# Patient Record
Sex: FEMALE | Race: WHITE | Employment: OTHER | URBAN - METROPOLITAN AREA
[De-identification: names, ages, dates, MRNs, and addresses within clinical notes are randomized per-mention and may not be internally consistent; named-entity substitution may affect disease eponyms.]

---

## 2017-06-28 ENCOUNTER — HOSPITAL ENCOUNTER (OUTPATIENT)
Dept: MAMMOGRAPHY | Age: 67
Discharge: HOME | End: 2017-06-28
Payer: MEDICARE

## 2017-06-28 DIAGNOSIS — Z12.31 ENCOUNTER FOR SCREENING MAMMOGRAM FOR MALIGNANT NEOPLASM OF BREAST: ICD-10-CM

## 2017-06-28 PROCEDURE — 77063 BREAST TOMOSYNTHESIS BI: CPT

## 2018-07-13 ENCOUNTER — HOSPITAL ENCOUNTER (OUTPATIENT)
Dept: MAMMOGRAPHY | Age: 68
Discharge: HOME | End: 2018-07-15
Payer: MEDICARE

## 2018-07-13 DIAGNOSIS — Z12.31 VISIT FOR SCREENING MAMMOGRAM: ICD-10-CM

## 2018-07-13 PROCEDURE — 77063 BREAST TOMOSYNTHESIS BI: CPT

## 2019-08-15 ENCOUNTER — HOSPITAL ENCOUNTER (OUTPATIENT)
Dept: MAMMOGRAPHY | Age: 69
Discharge: HOME | End: 2019-08-17
Payer: MEDICARE

## 2019-08-15 DIAGNOSIS — Z12.31 VISIT FOR SCREENING MAMMOGRAM: ICD-10-CM

## 2019-08-15 PROCEDURE — 77063 BREAST TOMOSYNTHESIS BI: CPT

## 2021-01-09 ENCOUNTER — HOSPITAL ENCOUNTER (OUTPATIENT)
Dept: MAMMOGRAPHY | Age: 71
Discharge: HOME | End: 2021-01-11
Payer: MEDICARE

## 2021-01-09 DIAGNOSIS — Z12.31 VISIT FOR SCREENING MAMMOGRAM: ICD-10-CM

## 2021-01-09 PROCEDURE — 77063 BREAST TOMOSYNTHESIS BI: CPT

## 2022-03-09 ENCOUNTER — HOSPITAL ENCOUNTER (OUTPATIENT)
Dept: MAMMOGRAPHY | Age: 72
Discharge: HOME | End: 2022-03-11
Payer: MEDICARE

## 2022-03-09 DIAGNOSIS — Z12.31 ENCOUNTER FOR SCREENING MAMMOGRAM FOR BREAST CANCER: ICD-10-CM

## 2022-03-09 PROCEDURE — 77063 BREAST TOMOSYNTHESIS BI: CPT

## 2022-09-19 ENCOUNTER — HOSPITAL ENCOUNTER (INPATIENT)
Age: 72
LOS: 1 days | Discharge: NURSING, REHAB OR LONG-TERM CARE | DRG: 193 | End: 2022-09-22
Attending: EMERGENCY MEDICINE | Admitting: HOSPITALIST
Payer: MEDICARE

## 2022-09-19 ENCOUNTER — APPOINTMENT (OUTPATIENT)
Dept: CT IMAGING | Age: 72
DRG: 193 | End: 2022-09-19
Payer: MEDICARE

## 2022-09-19 ENCOUNTER — APPOINTMENT (OUTPATIENT)
Dept: GENERAL RADIOLOGY | Age: 72
DRG: 193 | End: 2022-09-19
Payer: MEDICARE

## 2022-09-19 DIAGNOSIS — R41.82 ALTERED MENTAL STATUS, UNSPECIFIED ALTERED MENTAL STATUS TYPE: Primary | ICD-10-CM

## 2022-09-19 LAB
ABSOLUTE EOS #: 0.1 K/UL (ref 0–0.44)
ABSOLUTE IMMATURE GRANULOCYTE: 0.04 K/UL (ref 0–0.3)
ABSOLUTE LYMPH #: 1.59 K/UL (ref 1.1–3.7)
ABSOLUTE MONO #: 0.6 K/UL (ref 0.1–1.2)
AMMONIA: 51 UMOL/L (ref 11–51)
AMMONIA: 55 UMOL/L (ref 11–51)
ANION GAP SERPL CALCULATED.3IONS-SCNC: 11 MMOL/L (ref 9–17)
BASOPHILS # BLD: 0 % (ref 0–2)
BASOPHILS ABSOLUTE: <0.03 K/UL (ref 0–0.2)
BILIRUBIN URINE: ABNORMAL
BUN BLDV-MCNC: 12 MG/DL (ref 8–23)
BUN/CREAT BLD: 21 (ref 9–20)
CALCIUM SERPL-MCNC: 9.1 MG/DL (ref 8.6–10.4)
CHLORIDE BLD-SCNC: 100 MMOL/L (ref 98–107)
CO2: 25 MMOL/L (ref 20–31)
COLOR: YELLOW
CREAT SERPL-MCNC: 0.57 MG/DL (ref 0.5–0.9)
EOSINOPHILS RELATIVE PERCENT: 1 % (ref 1–4)
EPITHELIAL CELLS UA: ABNORMAL /HPF (ref 0–5)
GFR AFRICAN AMERICAN: >60 ML/MIN
GFR NON-AFRICAN AMERICAN: >60 ML/MIN
GFR SERPL CREATININE-BSD FRML MDRD: ABNORMAL ML/MIN/{1.73_M2}
GLUCOSE BLD-MCNC: 70 MG/DL (ref 70–99)
GLUCOSE URINE: NEGATIVE
HCT VFR BLD CALC: 33.9 % (ref 36.3–47.1)
HEMOGLOBIN: 11.1 G/DL (ref 11.9–15.1)
IMMATURE GRANULOCYTES: 0 %
KETONES, URINE: ABNORMAL
LEUKOCYTE ESTERASE, URINE: ABNORMAL
LYMPHOCYTES # BLD: 17 % (ref 24–43)
MCH RBC QN AUTO: 31.9 PG (ref 25.2–33.5)
MCHC RBC AUTO-ENTMCNC: 32.7 G/DL (ref 28.4–34.8)
MCV RBC AUTO: 97.4 FL (ref 82.6–102.9)
MONOCYTES # BLD: 7 % (ref 3–12)
MUCUS: ABNORMAL
NITRITE, URINE: NEGATIVE
NRBC AUTOMATED: 0 PER 100 WBC
PDW BLD-RTO: 16.2 % (ref 11.8–14.4)
PH UA: 6.5 (ref 5–8)
PLATELET # BLD: 196 K/UL (ref 138–453)
PMV BLD AUTO: 9.9 FL (ref 8.1–13.5)
POTASSIUM SERPL-SCNC: 3.8 MMOL/L (ref 3.7–5.3)
PRO-BNP: 129 PG/ML
PROCALCITONIN: 0.05 NG/ML
PROTEIN UA: ABNORMAL
RBC # BLD: 3.48 M/UL (ref 3.95–5.11)
RBC # BLD: ABNORMAL 10*6/UL
RBC UA: ABNORMAL /HPF (ref 0–2)
SARS-COV-2, RAPID: NOT DETECTED
SEG NEUTROPHILS: 75 % (ref 36–65)
SEGMENTED NEUTROPHILS ABSOLUTE COUNT: 6.79 K/UL (ref 1.5–8.1)
SODIUM BLD-SCNC: 136 MMOL/L (ref 135–144)
SPECIFIC GRAVITY UA: 1.02 (ref 1–1.03)
SPECIMEN DESCRIPTION: NORMAL
TROPONIN, HIGH SENSITIVITY: 16 NG/L (ref 0–14)
TURBIDITY: ABNORMAL
URINE HGB: ABNORMAL
UROBILINOGEN, URINE: NORMAL
WBC # BLD: 9.1 K/UL (ref 3.5–11.3)
WBC UA: ABNORMAL /HPF (ref 0–5)

## 2022-09-19 PROCEDURE — 80048 BASIC METABOLIC PNL TOTAL CA: CPT

## 2022-09-19 PROCEDURE — 2580000003 HC RX 258: Performed by: HOSPITALIST

## 2022-09-19 PROCEDURE — 87635 SARS-COV-2 COVID-19 AMP PRB: CPT

## 2022-09-19 PROCEDURE — G0378 HOSPITAL OBSERVATION PER HR: HCPCS

## 2022-09-19 PROCEDURE — 96361 HYDRATE IV INFUSION ADD-ON: CPT

## 2022-09-19 PROCEDURE — 96375 TX/PRO/DX INJ NEW DRUG ADDON: CPT

## 2022-09-19 PROCEDURE — 71045 X-RAY EXAM CHEST 1 VIEW: CPT

## 2022-09-19 PROCEDURE — 36415 COLL VENOUS BLD VENIPUNCTURE: CPT

## 2022-09-19 PROCEDURE — 2580000003 HC RX 258: Performed by: EMERGENCY MEDICINE

## 2022-09-19 PROCEDURE — 96376 TX/PRO/DX INJ SAME DRUG ADON: CPT

## 2022-09-19 PROCEDURE — 6360000002 HC RX W HCPCS: Performed by: EMERGENCY MEDICINE

## 2022-09-19 PROCEDURE — 87086 URINE CULTURE/COLONY COUNT: CPT

## 2022-09-19 PROCEDURE — 81001 URINALYSIS AUTO W/SCOPE: CPT

## 2022-09-19 PROCEDURE — 70450 CT HEAD/BRAIN W/O DYE: CPT

## 2022-09-19 PROCEDURE — 96372 THER/PROPH/DIAG INJ SC/IM: CPT

## 2022-09-19 PROCEDURE — 93005 ELECTROCARDIOGRAM TRACING: CPT | Performed by: EMERGENCY MEDICINE

## 2022-09-19 PROCEDURE — 83880 ASSAY OF NATRIURETIC PEPTIDE: CPT

## 2022-09-19 PROCEDURE — 99285 EMERGENCY DEPT VISIT HI MDM: CPT

## 2022-09-19 PROCEDURE — 6360000002 HC RX W HCPCS: Performed by: HOSPITALIST

## 2022-09-19 PROCEDURE — 84145 PROCALCITONIN (PCT): CPT

## 2022-09-19 PROCEDURE — 82140 ASSAY OF AMMONIA: CPT

## 2022-09-19 PROCEDURE — 84484 ASSAY OF TROPONIN QUANT: CPT

## 2022-09-19 PROCEDURE — 85025 COMPLETE CBC W/AUTO DIFF WBC: CPT

## 2022-09-19 PROCEDURE — 87040 BLOOD CULTURE FOR BACTERIA: CPT

## 2022-09-19 PROCEDURE — 96367 TX/PROPH/DG ADDL SEQ IV INF: CPT

## 2022-09-19 PROCEDURE — 96365 THER/PROPH/DIAG IV INF INIT: CPT

## 2022-09-19 RX ORDER — MAGNESIUM HYDROXIDE/ALUMINUM HYDROXICE/SIMETHICONE 120; 1200; 1200 MG/30ML; MG/30ML; MG/30ML
5 SUSPENSION ORAL EVERY 6 HOURS PRN
COMMUNITY

## 2022-09-19 RX ORDER — POTASSIUM CHLORIDE 20 MEQ/1
20 TABLET, EXTENDED RELEASE ORAL DAILY
COMMUNITY

## 2022-09-19 RX ORDER — VITAMIN B COMPLEX
100 TABLET ORAL DAILY
COMMUNITY

## 2022-09-19 RX ORDER — POTASSIUM CHLORIDE 7.45 MG/ML
10 INJECTION INTRAVENOUS PRN
Status: DISCONTINUED | OUTPATIENT
Start: 2022-09-19 | End: 2022-09-22 | Stop reason: HOSPADM

## 2022-09-19 RX ORDER — MEGESTROL ACETATE 40 MG/1
80 TABLET ORAL 2 TIMES DAILY
COMMUNITY

## 2022-09-19 RX ORDER — PHENOL 1.4 %
10 AEROSOL, SPRAY (ML) MUCOUS MEMBRANE NIGHTLY PRN
COMMUNITY

## 2022-09-19 RX ORDER — GLIMEPIRIDE 1 MG/1
1 TABLET ORAL 2 TIMES DAILY WITH MEALS
COMMUNITY

## 2022-09-19 RX ORDER — DULOXETIN HYDROCHLORIDE 30 MG/1
30 CAPSULE, DELAYED RELEASE ORAL DAILY
COMMUNITY

## 2022-09-19 RX ORDER — TAMOXIFEN CITRATE 20 MG/1
20 TABLET ORAL 2 TIMES DAILY
COMMUNITY

## 2022-09-19 RX ORDER — SENNA PLUS 8.6 MG/1
1 TABLET ORAL 2 TIMES DAILY PRN
Status: DISCONTINUED | OUTPATIENT
Start: 2022-09-19 | End: 2022-09-22 | Stop reason: HOSPADM

## 2022-09-19 RX ORDER — ACETAMINOPHEN 325 MG/1
650 TABLET ORAL EVERY 6 HOURS PRN
Status: DISCONTINUED | OUTPATIENT
Start: 2022-09-19 | End: 2022-09-22 | Stop reason: HOSPADM

## 2022-09-19 RX ORDER — PANTOPRAZOLE SODIUM 40 MG/1
40 TABLET, DELAYED RELEASE ORAL DAILY
COMMUNITY

## 2022-09-19 RX ORDER — METFORMIN HYDROCHLORIDE 500 MG/1
1000 TABLET, EXTENDED RELEASE ORAL 2 TIMES DAILY
COMMUNITY

## 2022-09-19 RX ORDER — SODIUM CHLORIDE 9 MG/ML
INJECTION, SOLUTION INTRAVENOUS PRN
Status: DISCONTINUED | OUTPATIENT
Start: 2022-09-19 | End: 2022-09-22 | Stop reason: HOSPADM

## 2022-09-19 RX ORDER — SODIUM CHLORIDE 0.9 % (FLUSH) 0.9 %
10 SYRINGE (ML) INJECTION EVERY 12 HOURS SCHEDULED
Status: DISCONTINUED | OUTPATIENT
Start: 2022-09-19 | End: 2022-09-22 | Stop reason: HOSPADM

## 2022-09-19 RX ORDER — DIVALPROEX SODIUM 500 MG/1
500 TABLET, DELAYED RELEASE ORAL 2 TIMES DAILY
COMMUNITY

## 2022-09-19 RX ORDER — LISINOPRIL 10 MG/1
10 TABLET ORAL DAILY
COMMUNITY

## 2022-09-19 RX ORDER — THIAMINE HCL 100 MG
500 TABLET ORAL 2 TIMES DAILY
COMMUNITY

## 2022-09-19 RX ORDER — DOCUSATE SODIUM 100 MG/1
100 CAPSULE, LIQUID FILLED ORAL 2 TIMES DAILY PRN
COMMUNITY

## 2022-09-19 RX ORDER — LEVETIRACETAM 1000 MG/1
1000 TABLET ORAL EVERY EVENING
Status: ON HOLD | COMMUNITY
End: 2022-09-22 | Stop reason: HOSPADM

## 2022-09-19 RX ORDER — CARVEDILOL 25 MG/1
25 TABLET ORAL 2 TIMES DAILY WITH MEALS
COMMUNITY

## 2022-09-19 RX ORDER — SIMVASTATIN 20 MG
20 TABLET ORAL NIGHTLY
COMMUNITY

## 2022-09-19 RX ORDER — POTASSIUM CHLORIDE 20 MEQ/1
40 TABLET, EXTENDED RELEASE ORAL PRN
Status: DISCONTINUED | OUTPATIENT
Start: 2022-09-19 | End: 2022-09-22 | Stop reason: HOSPADM

## 2022-09-19 RX ORDER — SODIUM CHLORIDE 9 MG/ML
INJECTION, SOLUTION INTRAVENOUS CONTINUOUS
Status: DISCONTINUED | OUTPATIENT
Start: 2022-09-19 | End: 2022-09-21

## 2022-09-19 RX ORDER — ONDANSETRON 4 MG/1
4 TABLET, ORALLY DISINTEGRATING ORAL EVERY 8 HOURS PRN
COMMUNITY

## 2022-09-19 RX ORDER — ONDANSETRON 2 MG/ML
4 INJECTION INTRAMUSCULAR; INTRAVENOUS EVERY 6 HOURS PRN
Status: DISCONTINUED | OUTPATIENT
Start: 2022-09-19 | End: 2022-09-22 | Stop reason: HOSPADM

## 2022-09-19 RX ORDER — LEVETIRACETAM 1000 MG/1
500 TABLET ORAL EVERY MORNING
Status: ON HOLD | COMMUNITY
End: 2022-09-22 | Stop reason: HOSPADM

## 2022-09-19 RX ORDER — M-VIT,TX,IRON,MINS/CALC/FOLIC 27MG-0.4MG
1 TABLET ORAL DAILY
COMMUNITY

## 2022-09-19 RX ORDER — TRAZODONE HYDROCHLORIDE 100 MG/1
100 TABLET ORAL NIGHTLY
COMMUNITY

## 2022-09-19 RX ORDER — SODIUM CHLORIDE 0.9 % (FLUSH) 0.9 %
10 SYRINGE (ML) INJECTION PRN
Status: DISCONTINUED | OUTPATIENT
Start: 2022-09-19 | End: 2022-09-22 | Stop reason: HOSPADM

## 2022-09-19 RX ORDER — ONDANSETRON 4 MG/1
4 TABLET, ORALLY DISINTEGRATING ORAL EVERY 8 HOURS PRN
Status: DISCONTINUED | OUTPATIENT
Start: 2022-09-19 | End: 2022-09-22 | Stop reason: HOSPADM

## 2022-09-19 RX ORDER — SODIUM CHLORIDE 9 MG/ML
INJECTION, SOLUTION INTRAVENOUS CONTINUOUS
Status: DISCONTINUED | OUTPATIENT
Start: 2022-09-19 | End: 2022-09-19 | Stop reason: SDUPTHER

## 2022-09-19 RX ORDER — ACETAMINOPHEN 500 MG
500 TABLET ORAL EVERY 6 HOURS PRN
Status: ON HOLD | COMMUNITY
End: 2022-09-22 | Stop reason: HOSPADM

## 2022-09-19 RX ORDER — MAGNESIUM SULFATE 1 G/100ML
1000 INJECTION INTRAVENOUS PRN
Status: DISCONTINUED | OUTPATIENT
Start: 2022-09-19 | End: 2022-09-22 | Stop reason: HOSPADM

## 2022-09-19 RX ORDER — ACETAMINOPHEN 650 MG/1
650 SUPPOSITORY RECTAL EVERY 6 HOURS PRN
Status: DISCONTINUED | OUTPATIENT
Start: 2022-09-19 | End: 2022-09-22 | Stop reason: HOSPADM

## 2022-09-19 RX ORDER — LANOLIN ALCOHOL/MO/W.PET/CERES
1000 CREAM (GRAM) TOPICAL DAILY
COMMUNITY

## 2022-09-19 RX ORDER — ENOXAPARIN SODIUM 100 MG/ML
40 INJECTION SUBCUTANEOUS DAILY
Status: DISCONTINUED | OUTPATIENT
Start: 2022-09-19 | End: 2022-09-22 | Stop reason: HOSPADM

## 2022-09-19 RX ADMIN — SODIUM CHLORIDE: 9 INJECTION, SOLUTION INTRAVENOUS at 12:39

## 2022-09-19 RX ADMIN — AZITHROMYCIN MONOHYDRATE 500 MG: 500 INJECTION, POWDER, LYOPHILIZED, FOR SOLUTION INTRAVENOUS at 15:38

## 2022-09-19 RX ADMIN — SODIUM CHLORIDE: 9 INJECTION, SOLUTION INTRAVENOUS at 21:33

## 2022-09-19 RX ADMIN — AZITHROMYCIN MONOHYDRATE 500 MG: 500 INJECTION, POWDER, LYOPHILIZED, FOR SOLUTION INTRAVENOUS at 23:44

## 2022-09-19 RX ADMIN — ENOXAPARIN SODIUM 40 MG: 100 INJECTION SUBCUTANEOUS at 21:36

## 2022-09-19 RX ADMIN — CEFTRIAXONE SODIUM 1000 MG: 1 INJECTION, POWDER, FOR SOLUTION INTRAMUSCULAR; INTRAVENOUS at 14:37

## 2022-09-19 ASSESSMENT — PAIN SCALES - WONG BAKER
WONGBAKER_NUMERICALRESPONSE: 0

## 2022-09-19 ASSESSMENT — PAIN - FUNCTIONAL ASSESSMENT: PAIN_FUNCTIONAL_ASSESSMENT: NONE - DENIES PAIN

## 2022-09-19 NOTE — ED PROVIDER NOTES
[address] [place] [address] ED  EMERGENCY DEPARTMENT ENCOUNTER      Pt Name: Sameer Shaikh  MRN: [id number]  [place] 1950  Date of evaluation: 9/19/2022  Provider: Kaiden Montenegro MD    [address] [place] [address]       Chief Complaint   Patient presents with    Altered Mental Status         HISTORY OF PRESENT ILLNESS  (Location/Symptom, Timing/Onset, Context/Setting, Quality, Duration, Modifying Factors, Severity.)   Sameer Shaikh is a 71 y.o. female who presents to the emergency department for altered mental status. All the history is given by her . He states that she did not sleep well last night and today she was not as active or interactive as she normally would be. No fever but she was sweaty. No cough or vomiting. He is worried that she might have a UTI which has happened previously. She is unable to give any history at all, she has dementia. He states normally she will get up and walk around but he was not able to get her walking today. Nursing Notes were reviewed. ALLERGIES     Patient has no known allergies. CURRENT MEDICATIONS       Previous Medications    No medications on file       PAST MEDICAL HISTORY   No past medical history on file. SURGICAL HISTORY     No past surgical history on file. FAMILY HISTORY     No family history on file. No family status information on file. SOCIAL HISTORY          REVIEW OF SYSTEMS    (2-9 systems for level 4, 10 or more for level 5)     Review of Systems   Unable to perform ROS: Dementia      Except as noted above the remainder of the review of systems was reviewed and negative. PHYSICAL EXAM    (up to 7 for level 4, 8 or more for level 5)     Vitals:    09/19/22 1150   BP: 116/66   Pulse: 83   Resp: 13   Temp: 98.2 °F (36.8 °C)   TempSrc: Axillary   SpO2: 95%       Physical Exam  Vitals reviewed. Constitutional:       General: She is not in acute distress. Appearance: She is well-developed. She is not diaphoretic.    HENT: Head: Normocephalic and atraumatic. Eyes:      General: No scleral icterus. Right eye: No discharge. Left eye: No discharge. Cardiovascular:      Rate and Rhythm: Normal rate and regular rhythm. Pulmonary:      Effort: Pulmonary effort is normal. No respiratory distress. Breath sounds: Normal breath sounds. No stridor. No wheezing or rales. Abdominal:      General: There is no distension. Palpations: Abdomen is soft. Tenderness: no abdominal tenderness   Musculoskeletal:         General: Normal range of motion. Cervical back: Neck supple. Lymphadenopathy:      Cervical: No cervical adenopathy. Skin:     General: Skin is warm and dry. Findings: No erythema or rash. Neurological:      Mental Status: She is alert. Comments: To open her eyes and look and follow simple commands. She does not answer any questions. She moves all 4 extremities. Psychiatric:      Comments: Cannot be tested           DIAGNOSTIC RESULTS     EKG: All EKG's are interpreted by the Emergency Department Physician who either signs or Co-signs this chart in the absence of a cardiologist.    GMI interpretation shows no acute findings and normal sinus rhythm    RADIOLOGY:   Non-plain film images such as CT, Ultrasound and MRI are read by the radiologist. Plain radiographic images are visualized and preliminarily interpreted by the emergency physician with the below findings:    Interpretation per the Radiologist below, if available at the time of this note:    CT HEAD WO CONTRAST    Result Date: 9/19/2022  EXAMINATION: CT OF THE HEAD WITHOUT CONTRAST  9/19/2022 9:56 am TECHNIQUE: CT of the head was performed without the administration of intravenous contrast. Automated exposure control, iterative reconstruction, and/or weight based adjustment of the mA/kV was utilized to reduce the radiation dose to as low as reasonably achievable. COMPARISON: None.  HISTORY: ORDERING SYSTEM PROVIDED HISTORY: Altered mental status TECHNOLOGIST PROVIDED HISTORY: Altered mental status Decision Support Exception - unselect if not a suspected or confirmed emergency medical condition->Emergency Medical Condition (MA) Reason for Exam: Altered mental status CT BRAIN FINDINGS: BRAIN/VENTRICLES: Old lacunar infarct in the right basal ganglia. The brainstem, and cerebellum have a normal appearance for the patient's age. The falx is midline. The ventricles and peripheral sulci are mildly dilated. There is decreased attenuation in the periventricular white matter. There is no sign of a space occupying lesion, acute infarction, or hemorrhage. Orbits: Portion of the orbits demonstrate no acute abnormality. SINUSES: . The imaged portions of the paranasal sinuses are clear. The mastoids and the middle ear chambers are clear. SOFT TISSUES/SKULL:  No acute abnormality of the visualized skull or soft tissues. Vascular calcifications are seen compatible with atherosclerotic disease. Mild central and cortical cerebral atrophy. Mild chronic deep white matter ischemic changes Old lacunar infarct in the right basal ganglia. No acute intracranial abnormalities are noted. XR CHEST PORTABLE    Result Date: 9/19/2022  EXAMINATION: ONE XRAY VIEW OF THE CHEST 9/19/2022 12:29 pm COMPARISON: None. HISTORY: ORDERING SYSTEM PROVIDED HISTORY: Altered mental status TECHNOLOGIST PROVIDED HISTORY: Altered mental status Reason for Exam: AMS. Confusion. FINDINGS: Low lung volumes and portable technique limit evaluation. Heart size is increase in this also obscures the left lung base. Prominent central pulmonary vascular interstitial prominence may be vascular crowding versus mild congestive change. Bibasilar atelectasis or infiltrates greater on the left suspected. Left-sided pleural effusion not excluded. Mediastinum unremarkable. No acute osseous abnormality.      Limited exam. Cardiomegaly with vascular crowding and or mild pulmonary vascular congestion. Bibasilar atelectasis or infiltrates crowding left with possible small left effusion. LABS:  Labs Reviewed   CBC WITH AUTO DIFFERENTIAL - Abnormal; Notable for the following components:       Result Value    RBC 3.48 (*)     Hemoglobin 11.1 (*)     Hematocrit 33.9 (*)     RDW 16.2 (*)     Seg Neutrophils 75 (*)     Lymphocytes 17 (*)     All other components within normal limits   BASIC METABOLIC PANEL - Abnormal; Notable for the following components:    Bun/Cre Ratio 21 (*)     All other components within normal limits   URINALYSIS - Abnormal; Notable for the following components:    Turbidity UA Cloudy (*)     Bilirubin Urine   (*)     Value: Presumptive positive. Unable to confirm due to unavailability of reagent. Ketones, Urine 3+ (*)     Urine Hgb 3+ (*)     Protein, UA TRACE (*)     Leukocyte Esterase, Urine TRACE (*)     All other components within normal limits   MICROSCOPIC URINALYSIS - Abnormal; Notable for the following components:    Mucus, UA 2+ (*)     All other components within normal limits   COVID-19, RAPID   CULTURE, BLOOD 1   CULTURE, BLOOD 1       All other labs were within normal range or not returned as of this dictation. EMERGENCY DEPARTMENT COURSE and DIFFERENTIAL DIAGNOSIS/MDM:   Vitals:    Vitals:    09/19/22 1150   BP: 116/66   Pulse: 83   Resp: 13   Temp: 98.2 °F (36.8 °C)   TempSrc: Axillary   SpO2: 95%       Orders Placed This Encounter   Medications    0.9 % sodium chloride infusion    cefTRIAXone (ROCEPHIN) 1,000 mg in dextrose 5 % 50 mL IVPB mini-bag     Order Specific Question:   Antimicrobial Indications     Answer:   Pneumonia (CAP)    azithromycin (ZITHROMAX) 500 mg in D5W 250ml addavial     Order Specific Question:   Antimicrobial Indications     Answer:   Pneumonia (CAP)       Medical Decision Making: The patient presents with altered mental status. There is questionable UTI and questionable pneumonia.   Cultures were obtained and she was given IV antibiotics and is being admitted. Findings are discussed with her . COVID test was negative. CONSULTS:  IP CONSULT TO HOSPITALIST    PROCEDURES:  None    FINAL IMPRESSION      1. Altered mental status, unspecified altered mental status type          DISPOSITION/PLAN   DISPOSITION Decision To Admit 09/19/2022 02:05:05 PM      PATIENT REFERRED TO:   No follow-up provider specified. DISCHARGE MEDICATIONS:     New Prescriptions    No medications on file       The care is provided during an unprecedented national emergency due to the novel coronavirus, COVID-19.     (Please note that portions of this note were completed with a voice recognition program.  Efforts were made to edit the dictations but occasionally words are mis-transcribed.)    Kaiden Montenegro MD  Attending Emergency Physician           Kaiden Montenegro MD  09/19/22 2834

## 2022-09-19 NOTE — PROGRESS NOTES
Transitions of Care Pharmacy Service   Medication Review    The patient's list of current home medications has been reviewed and updated. Source(s) of information: Patient's spouse Cuco Haynes ([address])    Patient started Depakote ER 500mg BID on 9/7/22 and has been transitioning off Keppra. She was originally on Keppra 1000mg BID. Her current dose is 500mg in the morning and 1000mg in the evening for today (9/19) and tomorrow (9/20). Then she will begin 500mg BID from 9/21-9/27 and finally take 500mg daily from 9/28-10/4. On 10/5 the Keppra will be stopped. Also, she is on tamoxifen alternating with megestrol every 3 weeks. She takes tamoxifen 20mg BID x 3 weeks then megestrol 80mg BID x 3 weeks. She just started back on the tamoxifen yesterday 9/18. Please feel free to call with any questions about this encounter. Thank you. Cuco Ivy, [facility]  Transitions of Care Pharmacy Service  Phone:  [phone]  Fax: [phone]    Prior to Admission medications    Medication Sig Start Date End Date Taking?  Authorizing Provider   carvedilol (COREG) 25 MG tablet Take 25 mg by mouth 2 times daily (with meals)   Yes Historical Provider, MD   DULoxetine (CYMBALTA) 30 MG extended release capsule Take 30 mg by mouth daily   Yes Historical Provider, MD   glimepiride (AMARYL) 1 MG tablet Take 1 mg by mouth 2 times daily (with meals)   Yes Historical Provider, MD   lisinopril (PRINIVIL;ZESTRIL) 10 MG tablet Take 10 mg by mouth daily   Yes Historical Provider, MD   pantoprazole (PROTONIX) 40 MG tablet Take 40 mg by mouth daily   Yes Historical Provider, MD   potassium chloride (KLOR-CON M) 20 MEQ extended release tablet Take 20 mEq by mouth daily   Yes Historical Provider, MD   simvastatin (ZOCOR) 20 MG tablet Take 20 mg by mouth nightly   Yes Historical Provider, MD   tamoxifen (NOLVADEX) 20 MG tablet Take 20 mg by mouth 2 times daily Indications: alternates with megestrol 80mg BID every 3 weeks, just started tamoxifen on 9/18/22   Yes Historical Provider, MD   levETIRAcetam (KEPPRA) 1000 MG tablet Take 500 mg by mouth every morning Indications: transitioning off keppra - 500mg BID from 9/21-9/27 then 500mg daily from 9/28-10/4 then off   Yes Historical Provider, MD   levETIRAcetam (KEPPRA) 1000 MG tablet Take 1,000 mg by mouth every evening Indications: transitioning off keppra - 500mg BID from 9/21-9/27 then 500mg daily from 9/28-10/4 then off   Yes Historical Provider, MD   metFORMIN (GLUCOPHAGE-XR) 500 MG extended release tablet Take 1,000 mg by mouth in the morning and at bedtime   Yes Historical Provider, MD   traZODone (DESYREL) 100 MG tablet Take 100 mg by mouth nightly   Yes Historical Provider, MD   Probiotic Product (PROBIOTIC ADVANCED PO) Take 1 tablet by mouth daily   Yes Historical Provider, MD   GINKGO BILOBA EXTRACT PO Take 1 tablet by mouth daily   Yes Historical Provider, MD   Magnesium 500 MG TABS Take 500 mg by mouth 2 times daily   Yes Historical Provider, MD   Multiple Vitamins-Minerals (THERAPEUTIC MULTIVITAMIN-MINERALS) tablet Take 1 tablet by mouth daily   Yes Historical Provider, MD   acetaminophen (TYLENOL) 500 MG tablet Take 500 mg by mouth every 6 hours as needed for Pain   Yes Historical Provider, MD   Coenzyme Q10 (COQ10) 100 MG CAPS Take 100 mg by mouth daily   Yes Historical Provider, MD   APPLE CIDER VINEGAR PO Take 1 tablet by mouth daily   Yes Historical Provider, MD   docusate sodium (COLACE) 100 MG capsule Take 100 mg by mouth 2 times daily as needed for Constipation   Yes Historical Provider, MD   aluminum & magnesium hydroxide-simethicone (MAALOX) 200-200-20 MG/5ML SUSP suspension Take 5 mLs by mouth every 6 hours as needed for Indigestion   Yes Historical Provider, MD   vitamin B-12 (CYANOCOBALAMIN) 1000 MCG tablet Take 1,000 mcg by mouth daily   Yes Historical Provider, MD   Melatonin 10 MG TABS Take 10 mg by mouth nightly as needed (sleep)   Yes Historical Provider, MD   ondansetron (ZOFRAN-ODT) 4 MG disintegrating tablet Take 4 mg by mouth every 8 hours as needed for Nausea or Vomiting   Yes Historical Provider, MD   megestrol (MEGACE) 40 MG tablet Take 80 mg by mouth 2 times daily Indications: alternates with tamoxifen 20mg BID every 3 weeks, next megestrol dose due 10/9/22   Yes Historical Provider, MD   divalproex (DEPAKOTE) 500 MG DR tablet Take 500 mg by mouth 2 times daily Indications: started on 9/7/22, patient currently transitioning off [address] - last dose will be 10/5/22   Yes Historical Provider, MD

## 2022-09-19 NOTE — ED NOTES
ED to inpatient nurses report     Chief Complaint   Patient presents with    Altered Mental Status      Present to ED from home via EMS. Pt  stated pt is more confused than normal. Pt has hx of dementia and parkinson. Pt  is her primary care taker. LOC: disoriented  Vital signs   Vitals:    09/19/22 1150   BP: 116/66   Pulse: 83   Resp: 13   Temp: 98.2 °F (36.8 °C)   TempSrc: Axillary   SpO2: 95%      Oxygen Baseline room air    Current needs required room air   LDAs:   Peripheral IV 09/19/22 Right Antecubital (Active)   Site Assessment Clean, dry & intact 09/19/22 1235   Line Status Blood return noted; Flushed;Normal saline locked;Specimen collected 09/19/22 1235   Dressing Status Clean, dry & intact 09/19/22 1235     Mobility: Fully dependent  Fall Risk: [place] 1 Fall Risk  Presents to emergency department  because of falls (Syncope, seizure, or loss of consciousness): No (09/19/22 1149)  Age > 79: Yes (09/19/22 1149)  Altered Mental Status, Intoxication with alcohol or substance confusion (Disorientation, impaired judgment, poor safety awaremess, or inability to follow instructions): Yes (09/19/22 1149)  Impaired Mobility: Ambulates or transfers with assistive devices or assistance;  Unable to ambulate or transer.: No (09/19/22 1149)  Nursing Judgement: Yes (09/19/22 1149)     Pending ED orders: none  Present condition: stable  Code Status: full code    Consults: IP CONSULT TO HOSPITALIST  [x]  Hospitalist   Completed  [x] yes [] no Who: Ulises Lazaro  []  Medicine  Completed  [] yes [] No Who:   []  Cardiology  Completed  [] yes [] No Who:   []  GI   Completed  [] yes [] No Who:   []  Neurology  Completed  [] yes [] No Who:   []  Nephrology Completed  [] yes [] No Who:    []  Vascular  Completed  [] yes [] No Who:   []  Ortho  Completed  [] yes [] No Who:     []  Surgery  Completed  [] yes [] No Who:    []  Urology  Completed  [] yes [] No Who:    []  CT Surgery Completed  [] yes [] No Who:   [] Podiatry  Completed  [] yes [] No Who:    []  Other    Completed  [] yes [] No Who:    Interventions: IV, labs, cultures, urine specimen, EKG, xray, CT, antibiotics, straight cath  Important Events: urine shows UTI. Zithro q24, rocephion q24.        Electronically signed by Ela Drew RN on 9/19/2022 at 7:46 PM            Ela Drew, [address] [place] [address]  09/19/22 3368

## 2022-09-19 NOTE — ED NOTES
Pt's  arrived to ED.  states this morning he was getting pt up for the day and pt \"abruptly sat down and began to shake\".  states pt has seizure disorder and is worried she may have had a seizure. Pt has \"bad days\" where she is completely un oriented-  states today is a day like that. Pt has alzheimer's and dementia. Pt is typically ambulatory but \"today seemed weak\".  is concerned about possible UTI as well as possible seizure.      Corbin Tyson RN  09/19/22 1465

## 2022-09-19 NOTE — ED NOTES
Pt to ED via EMS- EMS states pt's  called- pt has been altered for about three days. Pt has hx of dementia and parkinson's. Pt unoriented X4- unable to answer questions for herself. Pt does not seem to understand any questions RN asks.  EMS reports that  is great historian- RN awaiting 's arrival.      Pablo Koehler RN  09/19/22 0357

## 2022-09-19 NOTE — H&P
History & Physical  [facility].,    Adult Hospitalist      Name: Sue Aguirre  MRN: [id number]     Acct: [de-identified]  Room: [facility]/18    Admit Date: 9/19/2022 11:47 AM  PCP: Cesar Ray MD    Primary Problem  Active Problems:    * No active hospital problems. *  Resolved Problems:    * No resolved hospital problems. *        Assesment:     Concern for seizure  Acute metabolic encephalopathy  Bacteriuria  Left pleural effusion  Pulmonary vascular congestion/cardiomegaly  Bibasilar infiltrate/pneumonia-likely bacterial  Dementia          Plan:     Admitted to Medr telemetry  O2 to maintain oxygen saturation greater than 92%    Serial troponin  BNP  Echocardiogram    Procalcitonin  IV ceftriaxone and Zithromax  Monitor respiratory status  Urine culture    MRI brain  Monitor mental status  Ammonia  Neurology consult    Continue to monitor/telemetry/CBC with differential daily/BMP daily  DVT and GI prophylaxis. Continue medications as below      Scheduled Meds:   azithromycin  500 mg IntraVENous Once     Continuous Infusions:   sodium chloride 100 mL/hr at 09/19/22 1239     PRN Meds:       Chief Complaint:     Chief Complaint   Patient presents with    Altered Mental Status         History of Present Illness:      Sue Aguirre is a 71 y.o.  female who presents with Altered Mental Status    36-year-old lady was brought to the ER by her . There was concern the patient was not acting right. Per report, patient abruptly sat down and began to shake. Initially has been was concerned if she has had seizure. Patient has been intermittently confused and disoriented. Also complaining of very weak.  is concerned that usually when she gets infection she  Back. Patient has Alzheimer's dementia. She denies any chest pain, cough, cold, changes in urination, bowel habit or rash. On presentation hemoglobin was 11.1. UA was significant for trace leukocyte Estrace.   X-ray chest shows cardiomegaly with vascular crowding and mild pulmonary vascular congestion. Bibasilar atelectasis or infiltrate crowding left with possible small left effusion. CT brain did not show any acute changes. I have personally reviewed the past medical history, past surgical history, medications, social history, and family history, and summarized in the note. Review of Systems:     All 10 point system is reviewed and negative otherwise mentioned in HPI. Past Medical History:     No past medical history on file. Past Surgical History:     No past surgical history on file. Medications Prior to Admission:       Prior to Admission medications    Not on File        Allergies:       Patient has no known allergies. Social History:     Tobacco:    has no history on file for tobacco use. Alcohol:      has no history on file for alcohol use. Drug Use:  has no history on file for drug use. Family History:     No family history on file. Physical Exam:     Vitals:  /66   Pulse 83   Temp 98.2 °F (36.8 °C) (Axillary)   Resp 13   Ht 5' 2\" (1.575 m)   Wt 203 lb (92.1 kg)   SpO2 95%   BMI 37.13 kg/m²   Temp (24hrs), Av.2 °F (36.8 °C), Min:98.2 °F (36.8 °C), Max:98.2 °F (36.8 °C)          General appearance - alert, well appearing, and in no acute distress  Mental status -confused  Head - normocephalic and atraumatic  Eyes - pupils equal and reactive, extraocular eye movements intact, conjunctiva clear  Ears - hearing appears to be intact  Nose - no drainage noted  Mouth - mucous membranes moist  Neck - supple, no carotid bruits, thyroid not palpable  Chest - clear to auscultation, normal effort  Heart - normal rate, regular rhythm, no murmur  Abdomen - soft, nontender, nondistended, bowel sounds present all four quadrants, no masses, hepatomegaly or splenomegaly  Neurological - normal speech, no focal findings or movement disorder noted,.   Extremities - peripheral pulses palpable, no pedal edema or calf pain with palpation  Skin - no gross lesions, rashes, or induration noted        Data:     Labs:    Hematology:  Recent Labs     09/19/22  1231   WBC 9.1   RBC 3.48*   HGB 11.1*   HCT 33.9*   MCV 97.4   MCH 31.9   MCHC 32.7   RDW 16.2*      MPV 9.9     Chemistry:  Recent Labs     09/19/22  1231      K 3.8      CO2 25   GLUCOSE 70   BUN 12   CREATININE 0.57   ANIONGAP 11   LABGLOM >60   GFRAA >60   CALCIUM 9.1     No results for input(s): PROT, LABALBU, LABA1C, O6GPTTX, H2WKFTY, FT4, TSH, AST, ALT, LDH, GGT, ALKPHOS, LABGGT, BILITOT, BILIDIR, AMMONIA, AMYLASE, LIPASE, LACTATE, CHOL, HDL, LDLCHOLESTEROL, CHOLHDLRATIO, TRIG, VLDL, IBP32VN, PHENYTOIN, PHENYF, URICACID, POCGLU in the last 72 hours. No results found for: INR, PROTIME    No results found for: SPECIAL  No results found for: CULTURE    No results found for: POCPH, PHART, PH, POCPCO2, PXC0VLP, PCO2, POCPO2, PO2ART, PO2, POCHCO3, WUR6HTT, HCO3, NBEA, PBEA, BEART, BE, THGBART, THB, IBS0BTJ, ZYCF6OYJ, I8IOTJUC, O2SAT, FIO2    Radiology:    CT HEAD WO CONTRAST    Result Date: 9/19/2022  Mild central and cortical cerebral atrophy. Mild chronic deep white matter ischemic changes Old lacunar infarct in the right basal ganglia. No acute intracranial abnormalities are noted. XR CHEST PORTABLE    Result Date: 9/19/2022  Limited exam. Cardiomegaly with vascular crowding and or mild pulmonary vascular congestion. Bibasilar atelectasis or infiltrates crowding left with possible small left effusion.          All radiological studies reviewed                Code Status:  No Order    Electronically signed by Verona Smith MD on 9/19/2022 at 3:25 PM     Copy sent to Dr. Beverly Bailey MD    This note was created with the assistance of a speech-recognition program.  Although the intention is to generate a document that actually reflects the content of the visit, no guarantees can be provided that every mistake has been identified and corrected by editing. Note was updated later by me after  physical examination and  completion of the assessment.

## 2022-09-20 LAB
ABSOLUTE EOS #: 0.12 K/UL (ref 0–0.44)
ABSOLUTE IMMATURE GRANULOCYTE: 0.04 K/UL (ref 0–0.3)
ABSOLUTE LYMPH #: 1.56 K/UL (ref 1.1–3.7)
ABSOLUTE MONO #: 0.48 K/UL (ref 0.1–1.2)
ALBUMIN SERPL-MCNC: 3.2 G/DL (ref 3.5–5.2)
ALP BLD-CCNC: 41 U/L (ref 35–104)
ALT SERPL-CCNC: 12 U/L (ref 5–33)
ANION GAP SERPL CALCULATED.3IONS-SCNC: 5 MMOL/L (ref 9–17)
AST SERPL-CCNC: 13 U/L
BASOPHILS # BLD: 0 % (ref 0–2)
BASOPHILS ABSOLUTE: <0.03 K/UL (ref 0–0.2)
BILIRUB SERPL-MCNC: 0.2 MG/DL (ref 0.3–1.2)
BILIRUBIN DIRECT: <0.1 MG/DL
BILIRUBIN, INDIRECT: ABNORMAL MG/DL (ref 0–1)
BUN BLDV-MCNC: 7 MG/DL (ref 8–23)
BUN/CREAT BLD: 14 (ref 9–20)
CALCIUM SERPL-MCNC: 8.1 MG/DL (ref 8.6–10.4)
CHLORIDE BLD-SCNC: 108 MMOL/L (ref 98–107)
CO2: 27 MMOL/L (ref 20–31)
CREAT SERPL-MCNC: 0.49 MG/DL (ref 0.5–0.9)
EOSINOPHILS RELATIVE PERCENT: 2 % (ref 1–4)
GFR AFRICAN AMERICAN: >60 ML/MIN
GFR NON-AFRICAN AMERICAN: >60 ML/MIN
GFR SERPL CREATININE-BSD FRML MDRD: ABNORMAL ML/MIN/{1.73_M2}
GLUCOSE BLD-MCNC: 142 MG/DL (ref 65–105)
GLUCOSE BLD-MCNC: 96 MG/DL (ref 70–99)
HCT VFR BLD CALC: 31.6 % (ref 36.3–47.1)
HEMOGLOBIN: 10.1 G/DL (ref 11.9–15.1)
IMMATURE GRANULOCYTES: 1 %
LV EF: 65 %
LVEF MODALITY: NORMAL
LYMPHOCYTES # BLD: 27 % (ref 24–43)
MCH RBC QN AUTO: 31.4 PG (ref 25.2–33.5)
MCHC RBC AUTO-ENTMCNC: 32 G/DL (ref 28.4–34.8)
MCV RBC AUTO: 98.1 FL (ref 82.6–102.9)
MONOCYTES # BLD: 8 % (ref 3–12)
NRBC AUTOMATED: 0 PER 100 WBC
PDW BLD-RTO: 16.3 % (ref 11.8–14.4)
PLATELET # BLD: 174 K/UL (ref 138–453)
PMV BLD AUTO: 9.8 FL (ref 8.1–13.5)
POTASSIUM SERPL-SCNC: 3.8 MMOL/L (ref 3.7–5.3)
PRO-BNP: 512 PG/ML
RBC # BLD: 3.22 M/UL (ref 3.95–5.11)
RBC # BLD: ABNORMAL 10*6/UL
SEG NEUTROPHILS: 62 % (ref 36–65)
SEGMENTED NEUTROPHILS ABSOLUTE COUNT: 3.66 K/UL (ref 1.5–8.1)
SODIUM BLD-SCNC: 140 MMOL/L (ref 135–144)
TOTAL PROTEIN: 5 G/DL (ref 6.4–8.3)
TROPONIN, HIGH SENSITIVITY: 19 NG/L (ref 0–14)
TROPONIN, HIGH SENSITIVITY: 25 NG/L (ref 0–14)
WBC # BLD: 5.9 K/UL (ref 3.5–11.3)

## 2022-09-20 PROCEDURE — 95816 EEG AWAKE AND DROWSY: CPT

## 2022-09-20 PROCEDURE — 6370000000 HC RX 637 (ALT 250 FOR IP): Performed by: HOSPITALIST

## 2022-09-20 PROCEDURE — 84484 ASSAY OF TROPONIN QUANT: CPT

## 2022-09-20 PROCEDURE — 96366 THER/PROPH/DIAG IV INF ADDON: CPT

## 2022-09-20 PROCEDURE — 6360000002 HC RX W HCPCS: Performed by: HOSPITALIST

## 2022-09-20 PROCEDURE — 80048 BASIC METABOLIC PNL TOTAL CA: CPT

## 2022-09-20 PROCEDURE — 83880 ASSAY OF NATRIURETIC PEPTIDE: CPT

## 2022-09-20 PROCEDURE — 85025 COMPLETE CBC W/AUTO DIFF WBC: CPT

## 2022-09-20 PROCEDURE — 80076 HEPATIC FUNCTION PANEL: CPT

## 2022-09-20 PROCEDURE — 96361 HYDRATE IV INFUSION ADD-ON: CPT

## 2022-09-20 PROCEDURE — 93306 TTE W/DOPPLER COMPLETE: CPT

## 2022-09-20 PROCEDURE — 96372 THER/PROPH/DIAG INJ SC/IM: CPT

## 2022-09-20 PROCEDURE — 82947 ASSAY GLUCOSE BLOOD QUANT: CPT

## 2022-09-20 PROCEDURE — G0378 HOSPITAL OBSERVATION PER HR: HCPCS

## 2022-09-20 PROCEDURE — 2580000003 HC RX 258: Performed by: HOSPITALIST

## 2022-09-20 PROCEDURE — 36415 COLL VENOUS BLD VENIPUNCTURE: CPT

## 2022-09-20 PROCEDURE — 99223 1ST HOSP IP/OBS HIGH 75: CPT | Performed by: PSYCHIATRY & NEUROLOGY

## 2022-09-20 RX ORDER — LEVETIRACETAM 500 MG/1
500 TABLET ORAL DAILY
Status: DISCONTINUED | OUTPATIENT
Start: 2022-09-28 | End: 2022-09-20

## 2022-09-20 RX ORDER — DULOXETIN HYDROCHLORIDE 30 MG/1
30 CAPSULE, DELAYED RELEASE ORAL DAILY
Status: DISCONTINUED | OUTPATIENT
Start: 2022-09-20 | End: 2022-09-22 | Stop reason: HOSPADM

## 2022-09-20 RX ORDER — VITAMIN B COMPLEX
100 TABLET ORAL DAILY
Status: DISCONTINUED | OUTPATIENT
Start: 2022-09-20 | End: 2022-09-22 | Stop reason: HOSPADM

## 2022-09-20 RX ORDER — DOCUSATE SODIUM 100 MG/1
100 CAPSULE, LIQUID FILLED ORAL 2 TIMES DAILY PRN
Status: DISCONTINUED | OUTPATIENT
Start: 2022-09-20 | End: 2022-09-22 | Stop reason: HOSPADM

## 2022-09-20 RX ORDER — ONDANSETRON 4 MG/1
4 TABLET, ORALLY DISINTEGRATING ORAL EVERY 8 HOURS PRN
Status: DISCONTINUED | OUTPATIENT
Start: 2022-09-20 | End: 2022-09-20 | Stop reason: SDUPTHER

## 2022-09-20 RX ORDER — DEXTROSE MONOHYDRATE 100 MG/ML
INJECTION, SOLUTION INTRAVENOUS CONTINUOUS PRN
Status: DISCONTINUED | OUTPATIENT
Start: 2022-09-20 | End: 2022-09-22 | Stop reason: HOSPADM

## 2022-09-20 RX ORDER — LEVETIRACETAM 500 MG/1
1000 TABLET ORAL EVERY EVENING
Status: DISCONTINUED | OUTPATIENT
Start: 2022-09-20 | End: 2022-09-20 | Stop reason: SDUPTHER

## 2022-09-20 RX ORDER — TAMOXIFEN CITRATE 10 MG/1
20 TABLET ORAL 2 TIMES DAILY
Status: DISCONTINUED | OUTPATIENT
Start: 2022-09-20 | End: 2022-09-21

## 2022-09-20 RX ORDER — LACTULOSE 10 G/15ML
20 SOLUTION ORAL 3 TIMES DAILY
Status: DISCONTINUED | OUTPATIENT
Start: 2022-09-20 | End: 2022-09-21

## 2022-09-20 RX ORDER — METFORMIN HYDROCHLORIDE 500 MG/1
1000 TABLET, EXTENDED RELEASE ORAL 2 TIMES DAILY
Status: DISCONTINUED | OUTPATIENT
Start: 2022-09-20 | End: 2022-09-22 | Stop reason: HOSPADM

## 2022-09-20 RX ORDER — PANTOPRAZOLE SODIUM 40 MG/1
40 TABLET, DELAYED RELEASE ORAL DAILY
Status: DISCONTINUED | OUTPATIENT
Start: 2022-09-20 | End: 2022-09-21

## 2022-09-20 RX ORDER — LEVETIRACETAM 500 MG/1
500 TABLET ORAL 2 TIMES DAILY
Status: DISCONTINUED | OUTPATIENT
Start: 2022-09-21 | End: 2022-09-20

## 2022-09-20 RX ORDER — LANOLIN ALCOHOL/MO/W.PET/CERES
9 CREAM (GRAM) TOPICAL NIGHTLY PRN
Status: DISCONTINUED | OUTPATIENT
Start: 2022-09-20 | End: 2022-09-22 | Stop reason: HOSPADM

## 2022-09-20 RX ORDER — LANOLIN ALCOHOL/MO/W.PET/CERES
400 CREAM (GRAM) TOPICAL 2 TIMES DAILY
Status: DISCONTINUED | OUTPATIENT
Start: 2022-09-20 | End: 2022-09-22 | Stop reason: HOSPADM

## 2022-09-20 RX ORDER — GLIMEPIRIDE 1 MG/1
1 TABLET ORAL
Status: DISCONTINUED | OUTPATIENT
Start: 2022-09-20 | End: 2022-09-22

## 2022-09-20 RX ORDER — LANOLIN ALCOHOL/MO/W.PET/CERES
1000 CREAM (GRAM) TOPICAL DAILY
Status: DISCONTINUED | OUTPATIENT
Start: 2022-09-20 | End: 2022-09-22 | Stop reason: HOSPADM

## 2022-09-20 RX ORDER — LEVETIRACETAM 500 MG/1
1000 TABLET ORAL EVERY EVENING
Status: DISCONTINUED | OUTPATIENT
Start: 2022-09-20 | End: 2022-09-20

## 2022-09-20 RX ORDER — M-VIT,TX,IRON,MINS/CALC/FOLIC 27MG-0.4MG
1 TABLET ORAL DAILY
Status: DISCONTINUED | OUTPATIENT
Start: 2022-09-20 | End: 2022-09-22 | Stop reason: HOSPADM

## 2022-09-20 RX ORDER — TRAZODONE HYDROCHLORIDE 100 MG/1
100 TABLET ORAL NIGHTLY
Status: DISCONTINUED | OUTPATIENT
Start: 2022-09-20 | End: 2022-09-22 | Stop reason: HOSPADM

## 2022-09-20 RX ORDER — LEVETIRACETAM 1000 MG/1
1000 TABLET ORAL EVERY EVENING
Status: COMPLETED | OUTPATIENT
Start: 2022-09-20 | End: 2022-09-20

## 2022-09-20 RX ORDER — POTASSIUM CHLORIDE 20 MEQ/1
20 TABLET, EXTENDED RELEASE ORAL DAILY
Status: DISCONTINUED | OUTPATIENT
Start: 2022-09-20 | End: 2022-09-22 | Stop reason: HOSPADM

## 2022-09-20 RX ORDER — DIVALPROEX SODIUM 500 MG/1
500 TABLET, EXTENDED RELEASE ORAL 2 TIMES DAILY
Status: DISCONTINUED | OUTPATIENT
Start: 2022-09-20 | End: 2022-09-22 | Stop reason: HOSPADM

## 2022-09-20 RX ORDER — LEVETIRACETAM 500 MG/1
500 TABLET ORAL EVERY MORNING
Status: DISCONTINUED | OUTPATIENT
Start: 2022-09-21 | End: 2022-09-20 | Stop reason: SDUPTHER

## 2022-09-20 RX ORDER — MEGESTROL ACETATE 40 MG/1
80 TABLET ORAL 2 TIMES DAILY
Status: DISCONTINUED | OUTPATIENT
Start: 2022-09-20 | End: 2022-09-21

## 2022-09-20 RX ORDER — LEVETIRACETAM 500 MG/1
500 TABLET ORAL DAILY
Status: DISCONTINUED | OUTPATIENT
Start: 2022-09-28 | End: 2022-09-22 | Stop reason: HOSPADM

## 2022-09-20 RX ORDER — LISINOPRIL 10 MG/1
10 TABLET ORAL DAILY
Status: DISCONTINUED | OUTPATIENT
Start: 2022-09-20 | End: 2022-09-22 | Stop reason: HOSPADM

## 2022-09-20 RX ORDER — CARVEDILOL 25 MG/1
25 TABLET ORAL 2 TIMES DAILY
Status: DISCONTINUED | OUTPATIENT
Start: 2022-09-20 | End: 2022-09-22 | Stop reason: HOSPADM

## 2022-09-20 RX ORDER — LEVETIRACETAM 500 MG/1
500 TABLET ORAL 2 TIMES DAILY
Status: DISCONTINUED | OUTPATIENT
Start: 2022-09-21 | End: 2022-09-22 | Stop reason: HOSPADM

## 2022-09-20 RX ADMIN — ACETAMINOPHEN 650 MG: 325 TABLET, FILM COATED ORAL at 22:00

## 2022-09-20 RX ADMIN — POTASSIUM CHLORIDE 20 MEQ: 1500 TABLET, EXTENDED RELEASE ORAL at 21:39

## 2022-09-20 RX ADMIN — TRAZODONE HYDROCHLORIDE 100 MG: 100 TABLET ORAL at 21:39

## 2022-09-20 RX ADMIN — CEFTRIAXONE SODIUM 1000 MG: 1 INJECTION, POWDER, FOR SOLUTION INTRAMUSCULAR; INTRAVENOUS at 16:00

## 2022-09-20 RX ADMIN — TAMOXIFEN CITRATE 20 MG: 10 TABLET ORAL at 21:43

## 2022-09-20 RX ADMIN — LACTULOSE 20 G: 20 SOLUTION ORAL at 21:43

## 2022-09-20 RX ADMIN — PANTOPRAZOLE SODIUM 40 MG: 40 TABLET, DELAYED RELEASE ORAL at 21:40

## 2022-09-20 RX ADMIN — DULOXETINE HYDROCHLORIDE 30 MG: 30 CAPSULE, DELAYED RELEASE ORAL at 21:40

## 2022-09-20 RX ADMIN — METFORMIN HYDROCHLORIDE 1000 MG: 500 TABLET, EXTENDED RELEASE ORAL at 21:38

## 2022-09-20 RX ADMIN — DIVALPROEX SODIUM 500 MG: 500 TABLET, EXTENDED RELEASE ORAL at 21:42

## 2022-09-20 RX ADMIN — ENOXAPARIN SODIUM 40 MG: 100 INJECTION SUBCUTANEOUS at 10:47

## 2022-09-20 RX ADMIN — LEVETIRACETAM 1000 MG: 1000 TABLET ORAL at 21:41

## 2022-09-20 RX ADMIN — CARVEDILOL 25 MG: 25 TABLET, FILM COATED ORAL at 21:42

## 2022-09-20 ASSESSMENT — PAIN SCALES - WONG BAKER

## 2022-09-20 ASSESSMENT — PAIN DESCRIPTION - ORIENTATION: ORIENTATION: RIGHT

## 2022-09-20 ASSESSMENT — PAIN DESCRIPTION - DESCRIPTORS: DESCRIPTORS: SORE

## 2022-09-20 ASSESSMENT — PAIN DESCRIPTION - LOCATION: LOCATION: JAW

## 2022-09-20 NOTE — PROGRESS NOTES
Occupational Therapy  DATE: 2022    NAME: Vidhi Wilson  MRN: [id number]   : 1950    Patient not seen this date for Occupational Therapy due to:      [] Cancel by RN or physician due to:    [] Hemodialysis    [] Critical Lab Value Level     [] Blood transfusion in progress    [] Acute or unstable cardiovascular status   _MAP < 55 or more than >115  _HR < 40 or > 130    [] Acute or unstable pulmonary status   -FiO2 > 60%   _RR < 5 or >40    _O2 sats < 85%    [] Strict Bedrest    [] Off Unit for surgery or procedure    [] Off Unit for testing       [] Pending imaging to R/O fracture    [] Refusal by Patient      [x] Other- cx as pt is not appropriate at this time and pt's spouse requested pt to rest and check back later or tomorrow as able-RN in agreement and okayed      [] PT being discontinued at this time. Patient independent. No further needs. [] PT being discontinued at this time as the patient has been transferred to hospice care. No further needs.       Ignacio Jack, OT

## 2022-09-20 NOTE — DISCHARGE INSTR - COC
Continuity of Care Form    Patient Name: Ilene Roper   :  1950  MRN:  [id number]    Admit date:  2022  Discharge date:  2022    Code Status Order: Full Code   Advance Directives:     Admitting Physician:  Sergio Sexton MD  PCP: Camila Whitley MD    Discharging Nurse:   [address] Unit/Room#:   Discharging Unit Phone Number: [phone]    Emergency Contact:   Extended Emergency Contact Information  Primary Emergency Contact: Misti Sharma  Address: [facility] [address] [facility] [address] [facility] Phone: [phone]  Relation: Spouse  Secondary Emergency Contact: Caleb Mace[address] Phone: [phone]  Relation: Child    Past Surgical History:  Past Surgical History:   Procedure Laterality Date    BACK SURGERY      BRONCHOSCOPY      COLONOSCOPY      ESOPHAGOGASTRODUODENOSCOPY      ROBOTIC ASSISTED HYSTERECTOMY      BSO & LYMPH NODE DISSECTION       Immunization History: There is no immunization history on file for this patient.     Active Problems:  Patient Active Problem List   Diagnosis Code    AMS (altered mental status) R41.82       Isolation/Infection:   Isolation            No Isolation          Patient Infection Status       Infection Onset Added Last Indicated Last Indicated By Review Planned Expiration Resolved Resolved By    None active    Resolved    COVID-19 (Rule Out) 22 COVID-19, Rapid (Ordered)   22 Rule-Out Test Resulted            Nurse Assessment:  Last Vital Signs: /60   Pulse 80   Temp 97.8 °F (36.6 °C) (Axillary)   Resp 14   Ht 5' 2\" (1.575 m)   Wt 190 lb 6.4 oz (86.4 kg)   SpO2 95%   BMI 34.82 kg/m²     Last documented pain score (0-10 scale):    Last Weight:   Wt Readings from Last 1 Encounters:   22 190 lb 6.4 oz (86.4 kg)     Mental Status:  disoriented and alert    IV Access:  - None    Nursing Mobility/ADLs:  Walking Dependent  Transfer  Dependent  Bathing  Assisted  Dressing  Assisted  Toileting  Assisted  Feeding  Assisted  Med Admin  Assisted  Med Delivery   whole    Wound Care Documentation and Therapy:        Elimination:  Continence: Bowel: Yes  Bladder: No  Urinary Catheter: None   Colostomy/Ileostomy/Ileal Conduit: No       Date of Last BM:     Intake/Output Summary (Last 24 hours) at 9/20/2022 1648  Last data filed at 9/20/2022 1439  Gross per 24 hour   Intake 1770.92 ml   Output 2300 ml   Net -529.08 ml     I/O last 3 completed shifts: In: 1770.9 [P.O.:200; I.V.:1020.9; IV Piggyback:550]  Out: 600 [Urine:600]    Safety Concerns: At Risk for Falls and History of Seizures    Impairments/Disabilities:      Fall risk     Nutrition Therapy:  Current Nutrition Therapy:   - Oral Diet:  General    Routes of Feeding: Oral  Liquids: No Restrictions  Daily Fluid Restriction: no  Last Modified Barium Swallow with Video (Video Swallowing Test): not done    Treatments at the Time of Hospital Discharge:   Respiratory Treatments:   Oxygen Therapy:  is not on home oxygen therapy.   Ventilator:    - No ventilator support    Rehab Therapies: Physical Therapy and Occupational Therapy  Weight Bearing Status/Restrictions: No weight bearing restrictions  Other Medical Equipment (for information only, NOT a DME order):  walker  Other Treatments:     Patient's personal belongings (please select all that are sent with patient):  None    RN SIGNATURE:  Electronically signed by Taisha Coronado RN on 9/22/22 at 2:52 PM EDT    CASE MANAGEMENT/SOCIAL WORK SECTION    Inpatient Status Date: ***    Readmission Risk Assessment Score:  Readmission Risk              Risk of Unplanned Readmission:  0           Discharging to Facility/ Agency   Facility:  [address] [place] [address]  Address:  [facility] [address], [address] [place] [address]  Phone:   [phone]  Fax:   ***     Name: [facility] care (current)  Address:  [address] [phone]      [facility] signature: Electronically signed by NORMAN Goins, ALEXIAW on 9/20/22 at 4:51 PM EDT    PHYSICIAN SECTION    Prognosis: Good    Condition at Discharge: Stable    Rehab Potential (if transferring to Rehab): Good    Recommended Labs or Other Treatments After Discharge: Diagnosis:    Skilled Nursing per hospital recommendation ( Monitor Vitals,pain, Mental status, daily weight Blood sugar monitoring TIDAC.) Call MD if Blood sugar<60 or > 350,Fall precaution, wound care nix catheter removal as per Nursing home attending)  Skilled OT  Physical Therapy  Daily Labs: cbc,bmp q weekly  Monitor INR Daily if pt on coumadin   Coumadin management per SNF admitting physician unless otherwise specified. Oxygen 2L/minute   Blood sugar accucheck TIDAC  Daily Pulse oxymetry  Continue CPAP/BIPAP if pt wearing at home. Catheter/drain care per surgery  If pt on Tube feeding- please continue per hospital orders. Physician Certification: I certify the above information and transfer of Jay Clinton  is necessary for the continuing treatment of the diagnosis listed and that she requires [facility] for greater 30 days.      Update Admission H&P: No change in H&P    PHYSICIAN SIGNATURE:  Electronically signed by Tawanda Curry MD on 9/22/22 at 5:05 PM EDT

## 2022-09-20 NOTE — PLAN OF CARE
9/20/2022 0111)  Absence of infection at discharge:   Assess and monitor for signs and symptoms of infection   Monitor lab/diagnostic results   Monitor all insertion sites i.e., indwelling lines, tubes and drains   Administer medications as ordered

## 2022-09-20 NOTE — PROGRESS NOTES
Progress note  [facility]    Adult Hospitalist      Name: Narcisa Bailey  MRN: [id number]     Adriannalyside: [de-identified]  Room: 2025/2025-01    Admit Date: 9/19/2022 11:47 AM  PCP: Rach Cazares MD    Primary Problem  Principal Problem:    AMS (altered mental status)  Resolved Problems:    * No resolved hospital problems. *        Assesment:     Concern forSeizure  Hyperammonemia  Acute metabolic encephalopathy  Bacteriuria  Left pleural effusion  Pulmonary vascular congestion/cardiomegaly  Bibasilar infiltrate/pneumonia-likely bacterial  Dementia          Plan:     Admitted to [place] [address] telemetry  O2 to maintain oxygen saturation greater than 92%    Serial troponin  BNP  Echocardiogram    Procalcitonin  IV ceftriaxone and Zithromax  Monitor respiratory status  Urine culture    MRI brain pending  Monitor mental status  Ammonia: 55  Liver ultrasound  Liver panel  GI consult      Neurology consult    Continue to monitor/telemetry/CBC with differential daily/BMP daily  DVT and GI prophylaxis.   Continue medications as below      Scheduled Meds:   lactulose  20 g Oral TID    carvedilol  25 mg Oral BID WC    DULoxetine  30 mg Oral Daily    lisinopril  10 mg Oral Daily    pantoprazole  40 mg Oral Daily    potassium chloride  20 mEq Oral Daily    tamoxifen  20 mg Oral BID    [START ON 9/21/2022] levETIRAcetam  500 mg Oral QAM    levETIRAcetam  1,000 mg Oral QPM    metFORMIN  1,000 mg Oral BID    traZODone  100 mg Oral Nightly    Magnesium  500 mg Oral BID    therapeutic multivitamin-minerals  1 tablet Oral Daily    CoQ10  100 mg Oral Daily    vitamin B-12  1,000 mcg Oral Daily    megestrol  80 mg Oral BID    divalproex  500 mg Oral BID    glipiZIDE  2.5 mg Oral BID AC    sodium chloride flush  10 mL IntraVENous 2 times per day    enoxaparin  40 mg SubCUTAneous Daily    cefTRIAXone (ROCEPHIN) IV  1,000 mg IntraVENous Q24H    azithromycin  500 mg IntraVENous Q24H     Continuous Infusions:   dextrose      sodium chloride 75 mL/hr at 09/20/22 0528    sodium chloride       PRN Meds:  docusate sodium, 100 mg, BID PRN  Melatonin, 10 mg, Nightly PRN  ondansetron, 4 mg, Q8H PRN  glucose, 4 tablet, PRN  dextrose bolus, 125 mL, PRN   Or  dextrose bolus, 250 mL, PRN  glucagon (rDNA), 1 mg, PRN  dextrose, , Continuous PRN  sodium chloride flush, 10 mL, PRN  sodium chloride, , PRN  potassium chloride, 40 mEq, PRN   Or  potassium alternative oral replacement, 40 mEq, PRN   Or  potassium chloride, 10 mEq, PRN  magnesium sulfate, 1,000 mg, PRN  ondansetron, 4 mg, Q8H PRN   Or  ondansetron, 4 mg, Q6H PRN  senna, 1 tablet, BID PRN  acetaminophen, 650 mg, Q6H PRN   Or  acetaminophen, 650 mg, Q6H PRN    Chief Complaint:     Chief Complaint   Patient presents with    Altered Mental Status         History of Present Illness:      Patient seen examined at bedside. Patient still confused. No meaningful history provided  Afebrile  No acute events reported    HPI:        Vidhi Wilson is a 71 y.o.  female who presents with Altered Mental Status    67-year-old lady was brought to the ER by her . There was concern the patient was not acting right. Per report, patient abruptly sat down and began to shake. Initially has been was concerned if she has had seizure. Patient has been intermittently confused and disoriented. Also complaining of very weak.  is concerned that usually when she gets infection she  Back. Patient has Alzheimer's dementia. She denies any chest pain, cough, cold, changes in urination, bowel habit or rash. On presentation hemoglobin was 11.1. UA was significant for trace leukocyte Estrace. X-ray chest shows cardiomegaly with vascular crowding and mild pulmonary vascular congestion. Bibasilar atelectasis or infiltrate crowding left with possible small left effusion. CT brain did not show any acute changes.     I have personally reviewed the past medical history, past surgical history, medications, social history, and family history, and summarized in the note. Review of Systems:     All 10 point system is reviewed and negative otherwise mentioned in HPI. Past Medical History:     Past Medical History:   Diagnosis Date    Anxiety     Arteriovenous malformation of liver     Arthritis     Dementia ([facility] [address]     Depression     GERD (gastroesophageal reflux disease)     HTN (hypertension)     Hyperlipidemia     Hyperlipidemia     Insomnia     Malignant neoplasm metastatic to bone (HCC)     Malignant neoplasm of endocervix (HCC)     OP (osteoporosis)     Pneumonia     Seizure (HCC)     Type 2 diabetes mellitus ([facility] [address]     UTI (urinary tract infection)         Past Surgical History:     Past Surgical History:   Procedure Laterality Date    BACK SURGERY      BRONCHOSCOPY      COLONOSCOPY      ESOPHAGOGASTRODUODENOSCOPY      ROBOTIC ASSISTED HYSTERECTOMY      BSO & LYMPH NODE DISSECTION        Medications Prior to Admission:       Prior to Admission medications    Medication Sig Start Date End Date Taking?  Authorizing Provider   carvedilol (COREG) 25 MG tablet Take 25 mg by mouth 2 times daily (with meals)   Yes Historical Provider, MD   DULoxetine (CYMBALTA) 30 MG extended release capsule Take 30 mg by mouth daily   Yes Historical Provider, MD   glimepiride (AMARYL) 1 MG tablet Take 1 mg by mouth 2 times daily (with meals)   Yes Historical Provider, MD   lisinopril (PRINIVIL;ZESTRIL) 10 MG tablet Take 10 mg by mouth daily   Yes Historical Provider, MD   pantoprazole (PROTONIX) 40 MG tablet Take 40 mg by mouth daily   Yes Historical Provider, MD   potassium chloride (KLOR-CON M) 20 MEQ extended release tablet Take 20 mEq by mouth daily   Yes Historical Provider, MD   simvastatin (ZOCOR) 20 MG tablet Take 20 mg by mouth nightly   Yes Historical Provider, MD   tamoxifen (NOLVADEX) 20 MG tablet Take 20 mg by mouth 2 times daily Indications: alternates with megestrol 80mg BID every 3 weeks, just started tamoxifen on 9/18/22 Yes Historical Provider, MD   levETIRAcetam (KEPPRA) 1000 MG tablet Take 500 mg by mouth every morning Indications: transitioning off keppra - 500mg BID from 9/21-9/27 then 500mg daily from 9/28-10/4 then off   Yes Historical Provider, MD   levETIRAcetam (KEPPRA) 1000 MG tablet Take 1,000 mg by mouth every evening Indications: transitioning off keppra - 500mg BID from 9/21-9/27 then 500mg daily from 9/28-10/4 then off   Yes Historical Provider, MD   metFORMIN (GLUCOPHAGE-XR) 500 MG extended release tablet Take 1,000 mg by mouth in the morning and at bedtime   Yes Historical Provider, MD   traZODone (DESYREL) 100 MG tablet Take 100 mg by mouth nightly   Yes Historical Provider, MD   Probiotic Product (PROBIOTIC ADVANCED PO) Take 1 tablet by mouth daily   Yes Historical Provider, MD   GINKGO BILOBA EXTRACT PO Take 1 tablet by mouth daily   Yes Historical Provider, MD   Magnesium 500 MG TABS Take 500 mg by mouth 2 times daily   Yes Historical Provider, MD   Multiple Vitamins-Minerals (THERAPEUTIC MULTIVITAMIN-MINERALS) tablet Take 1 tablet by mouth daily   Yes Historical Provider, MD   acetaminophen (TYLENOL) 500 MG tablet Take 500 mg by mouth every 6 hours as needed for Pain   Yes Historical Provider, MD   Coenzyme Q10 (COQ10) 100 MG CAPS Take 100 mg by mouth daily   Yes Historical Provider, MD   APPLE CIDER VINEGAR PO Take 1 tablet by mouth daily   Yes Historical Provider, MD   docusate sodium (COLACE) 100 MG capsule Take 100 mg by mouth 2 times daily as needed for Constipation   Yes Historical Provider, MD   aluminum & magnesium hydroxide-simethicone (MAALOX) 200-200-20 MG/5ML SUSP suspension Take 5 mLs by mouth every 6 hours as needed for Indigestion   Yes Historical Provider, MD   vitamin B-12 (CYANOCOBALAMIN) 1000 MCG tablet Take 1,000 mcg by mouth daily   Yes Historical Provider, MD   Melatonin 10 MG TABS Take 10 mg by mouth nightly as needed (sleep)   Yes Historical Provider, MD   ondansetron (ZOFRAN-ODT) 4 MG disintegrating tablet Take 4 mg by mouth every 8 hours as needed for Nausea or Vomiting   Yes Historical Provider, MD   megestrol (MEGACE) 40 MG tablet Take 80 mg by mouth 2 times daily Indications: alternates with tamoxifen 20mg BID every 3 weeks, next megestrol dose due 10/9/22   Yes Historical Provider, MD   divalproex (DEPAKOTE) 500 MG DR tablet Take 500 mg by mouth 2 times daily Indications: started on 22, patient currently transitioning off [address] - last dose will be 10/5/22   Yes Historical Provider, MD        Allergies:       Ciprofloxacin, Pcn [penicillins], and Sulfa antibiotics    Social History:     Tobacco:    reports that she has never smoked. She has never used smokeless tobacco.  Alcohol:      reports no history of alcohol use. Drug Use:  reports no history of drug use. Family History:     No family history on file. Physical Exam:     Vitals:  /60   Pulse 80   Temp 97.8 °F (36.6 °C) (Axillary)   Resp 14   Ht 5' 2\" (1.575 m)   Wt 190 lb 6.4 oz (86.4 kg)   SpO2 95%   BMI 34.82 kg/m²   Temp (24hrs), Av.5 °F (36.9 °C), Min:97.8 °F (36.6 °C), Max:99.2 °F (37.3 °C)          General appearance - alert, well appearing, and in no acute distress  Mental status -confused  Head - normocephalic and atraumatic  Eyes - pupils equal and reactive, extraocular eye movements intact, conjunctiva clear  Ears - hearing appears to be intact  Nose - no drainage noted  Mouth - mucous membranes moist  Neck - supple, no carotid bruits, thyroid not palpable  Chest - clear to auscultation, normal effort  Heart - normal rate, regular rhythm, no murmur  Abdomen - soft, nontender, nondistended, bowel sounds present all four quadrants, no masses, hepatomegaly or splenomegaly  Neurological - normal speech, no focal findings or movement disorder noted,.   Extremities - peripheral pulses palpable, no pedal edema or calf pain with palpation  Skin - no gross lesions, rashes, or induration noted        Data: Labs:    Hematology:  Recent Labs     09/19/22 1231 09/20/22  0733   WBC 9.1 5.9   RBC 3.48* 3.22*   HGB 11.1* 10.1*   HCT 33.9* 31.6*   MCV 97.4 98.1   MCH 31.9 31.4   MCHC 32.7 32.0   RDW 16.2* 16.3*    174   MPV 9.9 9.8     Chemistry:  Recent Labs     09/19/22 1231 09/19/22 1806 09/19/22 2116 09/20/22  0733     --   --  140   K 3.8  --   --  3.8     --   --  108*   CO2 25  --   --  27   GLUCOSE 70  --   --  96   BUN 12  --   --  7*   CREATININE 0.57  --   --  0.49*   ANIONGAP 11  --   --  5*   LABGLOM >60  --   --  >60   GFRAA >60  --   --  >60   CALCIUM 9.1  --   --  8.1*   PROBNP  --  129  --  512*   TROPHS  --   --  16* 19*     Recent Labs     09/19/22 1806 09/19/22 2116   AMMONIA 51 55*       No results found for: INR, PROTIME    Lab Results   Component Value Date/Time    SPECIAL LFA,1ML 09/19/2022 02:12 PM     Lab Results   Component Value Date/Time    CULTURE NO GROWTH 21 HOURS 09/19/2022 02:12 PM       No results found for: POCPH, PHART, PH, POCPCO2, NQM0CPJ, PCO2, POCPO2, PO2ART, PO2, POCHCO3, RZT4FVW, HCO3, NBEA, PBEA, BEART, BE, THGBART, THB, ZAM0WLD, BIOG1VBK, E4KOLIQD, O2SAT, FIO2    Radiology:    CT HEAD WO CONTRAST    Result Date: 9/19/2022  Mild central and cortical cerebral atrophy. Mild chronic deep white matter ischemic changes Old lacunar infarct in the right basal ganglia. No acute intracranial abnormalities are noted. XR CHEST PORTABLE    Result Date: 9/19/2022  Limited exam. Cardiomegaly with vascular crowding and or mild pulmonary vascular congestion. Bibasilar atelectasis or infiltrates crowding left with possible small left effusion.          All radiological studies reviewed                Code Status:  Full Code    Electronically signed by Nikki Gallegos MD on 9/20/2022 at 2:48 PM     Copy sent to Dr. Rubia Gomez MD    This note was created with the assistance of a speech-recognition program.  Although the intention is to generate a document that actually reflects the content of the visit, no guarantees can be provided that every mistake has been identified and corrected by editing. Note was updated later by me after  physical examination and  completion of the assessment.

## 2022-09-20 NOTE — PROGRESS NOTES
Physical Therapy  DATE: 2022    NAME: Coleen Tamayo  MRN: [id number]   : 1950    Patient not seen this date for Physical Therapy due to:      [] Cancel by RN or physician due to:    [] Hemodialysis    [] Critical Lab Value Level     [] Blood transfusion in progress    [] Acute or unstable cardiovascular status   _MAP < 55 or more than >115  _HR < 40 or > 130    [] Acute or unstable pulmonary status   -FiO2 > 60%   _RR < 5 or >40    _O2 sats < 85%    [] Strict Bedrest    [] Off Unit for surgery or procedure    [] Off Unit for testing       [] Pending imaging to R/O fracture    [x] Refusal by Pt's spouse(Spouse requested pt to rest and check back later or tomorrow as able-RN in agreement and okayed)    [] Other      [] PT being discontinued at this time. Patient independent. No further needs. [] PT being discontinued at this time as the patient has been transferred to hospice care. No further needs.       [address], PT

## 2022-09-20 NOTE — PROGRESS NOTES
Patient admitted to room 2025 from ER. Patient alert but disoriented to time and place. Patient repositioned purewick applied.  at bedside. Oriented patient and  to unit, nurse, room and call light system. Siderails up x 3. Bed alarm on.

## 2022-09-20 NOTE — PLAN OF CARE
Problem: Discharge Planning  Goal: Discharge to home or other facility with appropriate resources  9/20/2022 1747 by Karma Feldman RN  Outcome: Progressing  9/20/2022 1745 by Karma Feldman RN  Outcome: Progressing

## 2022-09-20 NOTE — CARE COORDINATION
CASE MANAGEMENT NOTE:    Admission Date:  9/19/2022 Roman Latham is a 71 y.o.  female    Admitted for : Altered mental status, unspecified altered mental status type [R41.82]  AMS (altered mental status) [R41.82]    Met with:  Patient and Family    PCP: Carrie Angela MD                               Insurance:  Medicare and [employer]      Is patient alert and oriented at time of discussion:  Yes    Current Residence/ Living Arrangements:  at home dependent on family care             Current Services PTA:  Yes    Does patient go to outpatient dialysis: No  If yes, location and chair time: n/a  Who is their nephrologist? N/a    Is patient agreeable to VNS: Yes    Freedom of choice provided:  Yes    List of [address] provided: No    VNS chosen:  Yes    DME:  quad cane, walker, and shower chair    Home Oxygen: No    Nebulizer: No    CPAP/BIPAP: No    Supplier: N/A    Potential Assistance Needed: Yes    SNF needed: No    Freedom of choice and list provided: Yes    Pharmacy:  [facility] on [address] Is patient currently receiving oral anticoagulation therapy? NA    Is the Patient an [facility] with Readmission Risk Score greater than 14%? NA  If yes, pt needs a follow up appointment made within 7 days. Family Members/Caregivers that pt would like involved in their care:    Yes    If yes, list name here:  Spouse does 24 hour care cell: [phone] home [phone]    Transportation Provider:  Family             Discharge Plan:  The Plan for Transition of Care is related to the following treatment goals: plan is to return home with spouse doing the care with help from [facility] who is current with pt. They are considering long term issues but not yet ready to move in that direction per spouse. They are using the first floor of their home with a sofa bed and 1/2 bath downstairs. Using upstairs only for showers. Considering a chair lift for the stairs in the future per spouse.  Confirmed with marbella they are current and will plan to take back when discharged. The Patient and/or patient representative pt and spouse was provided with a choice of provider and agrees   with the discharge plan. [x] Yes [] No    Freedom of choice list was provided with basic dialogue that supports the patient's individualized plan of care/goals, treatment preferences and shares the quality data associated with the providers.  [x] Yes [] No                 Electronically signed by: NORMAN Uribe, YHOANA on 9/20/2022 at 4:40 PM

## 2022-09-20 NOTE — CONSULTS
Neurology Consult Note - Neurology Inpatient Service    Patient Name: Sue Aguirre  Patient : 1950  MRN: [id number]     Acct: [de-identified]  Date of Admission: 2022  Room/Bed:   PCP: Cesar Ray MD    2022    Reason for Consult: Altered mental state. History of Presenting Illness: The patient is 71 y.o. obese female -- who is being seen as a new consult for altered mental state. Symptom: Fluctuating mental state and decline in ability to perform ADLs. Manner of onset: Gradual with sudden decline. Description of event(s): The patient has baseline history of dementia along with cyclical insomnia followed by hypersomnia with each episode in the cycle lasting for up to 38+ hours or less. The patient was noted to be more lethargic than normal by her  and was not following directions including during her need to go to the bathroom. The  was unable to provide care and called 911 for further evaluation and recommendations. The patient is currently undergoing Keppra taper and up titration of Depakote, per recommendation by patient's outside neurologist for seizure control. Duration: Longstanding with current episode being 1 to 2 days in duration. Current state: Current state is described as her baseline. Severity: Symptoms are overall moderate to severe and impair patient's ability to perform any meaningful ADLs. This is this patient's baseline. Modifying factors: Minimal improvement in her symptoms when she is well rested-after she wakes up from her sleep. Otherwise in general, none. Service was consulted. Thank you. No reported focal or generalized tonic-clonic activity, tongue bite, loss of bowel or bladder control, generalized weakness in extremities or new onset pain. The above history was obtained through patient's , present at the bedside. Review of systems:    As noted in the above HPI.   The patient is unable to participate in review of systems interview. Past medical History, surgical history, family history and social history were reviewed with the patient/care provider and were reviewed in the chart. Only the available information is documented below. Thank you. Past Medical History:        Diagnosis Date    Anxiety     Arteriovenous malformation of liver     Arthritis     Dementia (HCC)     Depression     GERD (gastroesophageal reflux disease)     HTN (hypertension)     Hyperlipidemia     Hyperlipidemia     Insomnia     Malignant neoplasm metastatic to bone (HCC)     Malignant neoplasm of endocervix (HCC)     OP (osteoporosis)     Pneumonia     Seizure (HCC)     Type 2 diabetes mellitus ([facility] [address]     UTI (urinary tract infection)        Past Surgical History:        Procedure Laterality Date    BACK SURGERY      BRONCHOSCOPY      COLONOSCOPY      ESOPHAGOGASTRODUODENOSCOPY      ROBOTIC ASSISTED HYSTERECTOMY      BSO & LYMPH NODE DISSECTION       Family History:       No family history on file. Social History:      TOBACCO:   reports that she has never smoked. She has never used smokeless tobacco.  ETOH:   reports no history of alcohol use. RECREATIONAL DRUG USE:   Social History     Substance and Sexual Activity   Drug Use Never       The patient's medications and allergies were reviewed and the available information is documented below. Thank you. Allergies: Ciprofloxacin, Pcn [penicillins], and Sulfa antibiotics    Home Medications:   Prior to Admission medications    Medication Sig Start Date End Date Taking?  Authorizing Provider   carvedilol (COREG) 25 MG tablet Take 25 mg by mouth 2 times daily (with meals)   Yes Historical Provider, MD   DULoxetine (CYMBALTA) 30 MG extended release capsule Take 30 mg by mouth daily   Yes Historical Provider, MD   glimepiride (AMARYL) 1 MG tablet Take 1 mg by mouth 2 times daily (with meals)   Yes Historical Provider, MD   lisinopril (PRINIVIL;ZESTRIL) 10 MG tablet Take 10 mg by mouth daily   Yes Historical Provider, MD   pantoprazole (PROTONIX) 40 MG tablet Take 40 mg by mouth daily   Yes Historical Provider, MD   potassium chloride (KLOR-CON M) 20 MEQ extended release tablet Take 20 mEq by mouth daily   Yes Historical Provider, MD   simvastatin (ZOCOR) 20 MG tablet Take 20 mg by mouth nightly   Yes Historical Provider, MD   tamoxifen (NOLVADEX) 20 MG tablet Take 20 mg by mouth 2 times daily Indications: alternates with megestrol 80mg BID every 3 weeks, just started tamoxifen on 9/18/22   Yes Historical Provider, MD   levETIRAcetam (KEPPRA) 1000 MG tablet Take 500 mg by mouth every morning Indications: transitioning off keppra - 500mg BID from 9/21-9/27 then 500mg daily from 9/28-10/4 then off   Yes Historical Provider, MD   levETIRAcetam (KEPPRA) 1000 MG tablet Take 1,000 mg by mouth every evening Indications: transitioning off keppra - 500mg BID from 9/21-9/27 then 500mg daily from 9/28-10/4 then off   Yes Historical Provider, MD   metFORMIN (GLUCOPHAGE-XR) 500 MG extended release tablet Take 1,000 mg by mouth in the morning and at bedtime   Yes Historical Provider, MD   traZODone (DESYREL) 100 MG tablet Take 100 mg by mouth nightly   Yes Historical Provider, MD   Probiotic Product (PROBIOTIC ADVANCED PO) Take 1 tablet by mouth daily   Yes Historical Provider, MD   GINKGO BILOBA EXTRACT PO Take 1 tablet by mouth daily   Yes Historical Provider, MD   Magnesium 500 MG TABS Take 500 mg by mouth 2 times daily   Yes Historical Provider, MD   Multiple Vitamins-Minerals (THERAPEUTIC MULTIVITAMIN-MINERALS) tablet Take 1 tablet by mouth daily   Yes Historical Provider, MD   acetaminophen (TYLENOL) 500 MG tablet Take 500 mg by mouth every 6 hours as needed for Pain   Yes Historical Provider, MD   Coenzyme Q10 (COQ10) 100 MG CAPS Take 100 mg by mouth daily   Yes Historical Provider, MD   APPLE CIDER VINEGAR PO Take 1 tablet by mouth daily   Yes Historical Provider, MD   docusate sodium (COLACE) 100 MG capsule Take 100 mg by mouth 2 times daily as needed for Constipation   Yes Historical Provider, MD   aluminum & magnesium hydroxide-simethicone (MAALOX) 200-200-20 MG/5ML SUSP suspension Take 5 mLs by mouth every 6 hours as needed for Indigestion   Yes Historical Provider, MD   vitamin B-12 (CYANOCOBALAMIN) 1000 MCG tablet Take 1,000 mcg by mouth daily   Yes Historical Provider, MD   Melatonin 10 MG TABS Take 10 mg by mouth nightly as needed (sleep)   Yes Historical Provider, MD   ondansetron (ZOFRAN-ODT) 4 MG disintegrating tablet Take 4 mg by mouth every 8 hours as needed for Nausea or Vomiting   Yes Historical Provider, MD   megestrol (MEGACE) 40 MG tablet Take 80 mg by mouth 2 times daily Indications: alternates with tamoxifen 20mg BID every 3 weeks, next megestrol dose due 10/9/22   Yes Historical Provider, MD   divalproex (DEPAKOTE) 500 MG DR tablet Take 500 mg by mouth 2 times daily Indications: started on 9/7/22, patient currently transitioning off [address] - last dose will be 10/5/22   Yes Historical Provider, MD       Current Hospital Medications:    Current Facility-Administered Medications:     0.9 % sodium chloride infusion, , IntraVENous, Continuous, Sergio Sexton MD, Last Rate: 75 mL/hr at 09/20/22 0528, Rate Verify at 09/20/22 0528    sodium chloride flush 0.9 % injection 10 mL, 10 mL, IntraVENous, 2 times per day, Sergio Sexton MD    sodium chloride flush 0.9 % injection 10 mL, 10 mL, IntraVENous, PRN, Sergio Sexton MD    0.9 % sodium chloride infusion, , IntraVENous, PRN, Sergio Sexton MD    potassium chloride (KLOR-CON M) extended release tablet 40 mEq, 40 mEq, Oral, PRN **OR** potassium bicarb-citric acid (EFFER-K) effervescent tablet 40 mEq, 40 mEq, Oral, PRN **OR** potassium chloride 10 mEq/100 mL IVPB (Peripheral Line), 10 mEq, IntraVENous, PRN, Sergio Sexton MD    magnesium sulfate 1000 mg in dextrose 5% 100 mL IVPB, 1,000 mg, IntraVENous, PRN, Brynn Rivas Tennille Rivera MD    enoxaparin (LOVENOX) injection 40 mg, 40 mg, SubCUTAneous, Daily, Hillary Vigil MD, 40 mg at 09/20/22 1047    ondansetron (ZOFRAN-ODT) disintegrating tablet 4 mg, 4 mg, Oral, Q8H PRN **OR** ondansetron (ZOFRAN) injection 4 mg, 4 mg, IntraVENous, Q6H PRN, Hillary Vigil MD    senna (SENOKOT) tablet 8.6 mg, 1 tablet, Oral, BID PRN, Hillary Vigil MD    acetaminophen (TYLENOL) tablet 650 mg, 650 mg, Oral, Q6H PRN **OR** acetaminophen (TYLENOL) suppository 650 mg, 650 mg, Rectal, Q6H PRN, Hillary Vigil MD    cefTRIAXone (ROCEPHIN) 1,000 mg in dextrose 5 % 50 mL IVPB mini-bag, 1,000 mg, IntraVENous, Q24H, Hillary Vigil MD    azithromycin (ZITHROMAX) 500 mg in D5W 250ml addavial, 500 mg, IntraVENous, Q24H, Hillary Vigil MD, Stopped at 09/20/22 0044     Continuous Infusions:   sodium chloride 75 mL/hr at 09/20/22 0528    sodium chloride         Vital Signs:    Patient Vitals for the past 24 hrs:   BP Temp Temp src Pulse Resp SpO2 Height Weight   09/20/22 0723 116/60 97.8 °F (36.6 °C) Axillary 80 14 95 % -- --   09/20/22 0510 -- -- -- -- -- -- -- 190 lb 6.4 oz (86.4 kg)   09/19/22 2015 (!) 125/44 99.2 °F (37.3 °C) Oral 84 15 -- -- --   09/19/22 1637 116/68 -- -- -- -- -- -- --   09/19/22 1453 -- -- -- -- -- -- 5' 2\" (1.575 m) --   09/19/22 1452 -- -- -- -- -- -- -- 203 lb (92.1 kg)        Physical Examination:    General Exam:    Constitutional: The patient is obese. Cardiovascular: S1 and S2, regular rate and rhythm no overt murmurs. No carotid bruit and normal carotid pulse. Extremities: No cyanosis, no clubbing, and trace positive edema both feet and lower legs. Ophthalmology/funduscopic exam: Unremarkable/normal.    Neurological Exam:    Dexterity: The patient is RIGHT handed. Mental Status: Drowsy and poorly arousable at this time. She did not participate in speech, comprehension and ability to follow-up command related exams.     Fund of knowledge: insomnia. Recommendations:    EEG-ordered. Continue current treatment unchanged including, no changes in AEDs. Outpatient neurology follow-up, per prior appointment. Continue with supportive care and please implement seizure as well as aspiration and fall precautions. Further recommendations, per primary team.    Disposition/Discharge issues:    Neurology service today further recommendations after reviewing the patient's EEG. The above assessment and plan was discussed with the patient's  and, questions and concerns were addressed to his expressed understanding and satisfaction. Thank you.     Electronically signed by Alf Hogue MD on 9/20/2022 at 2:36 PM

## 2022-09-20 NOTE — PROGRESS NOTES
Patient receives Sacrament of the Sick (anointing) from [facility].    [facility] will follow as needed. (writer charting for [employer]   PT: anointed by Fr. Adelina Crisostomo for the sacrament of the sick     09/20/22 8361   Encounter Summary   Encounter [place] Encounter   Service Provided For: Patient   Last Encounter  09/20/22   Encounter    Type Initial Screen/Assessment   Rituals, Rites and Sacraments   Type Sacrament of Sick; Anointing

## 2022-09-21 ENCOUNTER — APPOINTMENT (OUTPATIENT)
Dept: ULTRASOUND IMAGING | Age: 72
DRG: 193 | End: 2022-09-21
Payer: MEDICARE

## 2022-09-21 ENCOUNTER — APPOINTMENT (OUTPATIENT)
Dept: GENERAL RADIOLOGY | Age: 72
DRG: 193 | End: 2022-09-21
Payer: MEDICARE

## 2022-09-21 PROBLEM — R79.89 INCREASED AMMONIA LEVEL: Status: ACTIVE | Noted: 2022-09-21

## 2022-09-21 PROBLEM — R53.1 WEAKNESS: Status: ACTIVE | Noted: 2022-09-21

## 2022-09-21 LAB
ABSOLUTE EOS #: 0.16 K/UL (ref 0–0.44)
ABSOLUTE IMMATURE GRANULOCYTE: 0.02 K/UL (ref 0–0.3)
ABSOLUTE LYMPH #: 1.5 K/UL (ref 1.1–3.7)
ABSOLUTE MONO #: 0.54 K/UL (ref 0.1–1.2)
ALBUMIN SERPL-MCNC: 3 G/DL (ref 3.5–5.2)
ALP BLD-CCNC: 39 U/L (ref 35–104)
ALT SERPL-CCNC: 10 U/L (ref 5–33)
AMMONIA: 39 UMOL/L (ref 11–51)
ANION GAP SERPL CALCULATED.3IONS-SCNC: 8 MMOL/L (ref 9–17)
AST SERPL-CCNC: 13 U/L
BASOPHILS # BLD: 0 % (ref 0–2)
BASOPHILS ABSOLUTE: <0.03 K/UL (ref 0–0.2)
BILIRUB SERPL-MCNC: 0.1 MG/DL (ref 0.3–1.2)
BILIRUBIN DIRECT: <0.1 MG/DL
BILIRUBIN, INDIRECT: ABNORMAL MG/DL (ref 0–1)
BUN BLDV-MCNC: 6 MG/DL (ref 8–23)
BUN/CREAT BLD: 14 (ref 9–20)
CALCIUM SERPL-MCNC: 8.1 MG/DL (ref 8.6–10.4)
CHLORIDE BLD-SCNC: 108 MMOL/L (ref 98–107)
CO2: 23 MMOL/L (ref 20–31)
CREAT SERPL-MCNC: 0.42 MG/DL (ref 0.5–0.9)
CULTURE: NORMAL
EKG ATRIAL RATE: 84 BPM
EKG P AXIS: 28 DEGREES
EKG P-R INTERVAL: 132 MS
EKG Q-T INTERVAL: 386 MS
EKG QRS DURATION: 82 MS
EKG QTC CALCULATION (BAZETT): 456 MS
EKG R AXIS: 22 DEGREES
EKG T AXIS: 66 DEGREES
EKG VENTRICULAR RATE: 84 BPM
EOSINOPHILS RELATIVE PERCENT: 3 % (ref 1–4)
GFR AFRICAN AMERICAN: >60 ML/MIN
GFR NON-AFRICAN AMERICAN: >60 ML/MIN
GFR SERPL CREATININE-BSD FRML MDRD: ABNORMAL ML/MIN/{1.73_M2}
GLUCOSE BLD-MCNC: 101 MG/DL (ref 70–99)
GLUCOSE BLD-MCNC: 139 MG/DL (ref 65–105)
GLUCOSE BLD-MCNC: 162 MG/DL (ref 65–105)
GLUCOSE BLD-MCNC: 171 MG/DL (ref 65–105)
GLUCOSE BLD-MCNC: 99 MG/DL (ref 65–105)
HAV IGM SER IA-ACNC: NONREACTIVE
HCT VFR BLD CALC: 30 % (ref 36.3–47.1)
HEMOGLOBIN: 9.6 G/DL (ref 11.9–15.1)
HEPATITIS B CORE IGM ANTIBODY: NONREACTIVE
HEPATITIS B SURFACE ANTIGEN: NONREACTIVE
HEPATITIS C ANTIBODY: NONREACTIVE
IMMATURE GRANULOCYTES: 0 %
LYMPHOCYTES # BLD: 28 % (ref 24–43)
MCH RBC QN AUTO: 31.4 PG (ref 25.2–33.5)
MCHC RBC AUTO-ENTMCNC: 32 G/DL (ref 28.4–34.8)
MCV RBC AUTO: 98 FL (ref 82.6–102.9)
MONOCYTES # BLD: 10 % (ref 3–12)
NRBC AUTOMATED: 0 PER 100 WBC
PDW BLD-RTO: 16.5 % (ref 11.8–14.4)
PLATELET # BLD: 161 K/UL (ref 138–453)
PMV BLD AUTO: 9.5 FL (ref 8.1–13.5)
POTASSIUM SERPL-SCNC: 3.9 MMOL/L (ref 3.7–5.3)
PRO-BNP: 238 PG/ML
RBC # BLD: 3.06 M/UL (ref 3.95–5.11)
RBC # BLD: ABNORMAL 10*6/UL
SEG NEUTROPHILS: 59 % (ref 36–65)
SEGMENTED NEUTROPHILS ABSOLUTE COUNT: 3.09 K/UL (ref 1.5–8.1)
SODIUM BLD-SCNC: 139 MMOL/L (ref 135–144)
SPECIMEN DESCRIPTION: NORMAL
TOTAL PROTEIN: 4.8 G/DL (ref 6.4–8.3)
TROPONIN, HIGH SENSITIVITY: 16 NG/L (ref 0–14)
TROPONIN, HIGH SENSITIVITY: 16 NG/L (ref 0–14)
TROPONIN, HIGH SENSITIVITY: 17 NG/L (ref 0–14)
VALPROIC ACID % FREE: 5.6 % (ref 5–18.4)
VALPROIC ACID LEVEL: 34 UG/ML (ref 50–125)
VALPROIC ACID, FREE: 1.9 UG/ML (ref 7–23)
WBC # BLD: 5.3 K/UL (ref 3.5–11.3)

## 2022-09-21 PROCEDURE — 97112 NEUROMUSCULAR REEDUCATION: CPT

## 2022-09-21 PROCEDURE — 82947 ASSAY GLUCOSE BLOOD QUANT: CPT

## 2022-09-21 PROCEDURE — 80074 ACUTE HEPATITIS PANEL: CPT

## 2022-09-21 PROCEDURE — 80048 BASIC METABOLIC PNL TOTAL CA: CPT

## 2022-09-21 PROCEDURE — 99222 1ST HOSP IP/OBS MODERATE 55: CPT | Performed by: INTERNAL MEDICINE

## 2022-09-21 PROCEDURE — 71045 X-RAY EXAM CHEST 1 VIEW: CPT

## 2022-09-21 PROCEDURE — 97530 THERAPEUTIC ACTIVITIES: CPT

## 2022-09-21 PROCEDURE — 80164 ASSAY DIPROPYLACETIC ACD TOT: CPT

## 2022-09-21 PROCEDURE — 95816 EEG AWAKE AND DROWSY: CPT | Performed by: PSYCHIATRY & NEUROLOGY

## 2022-09-21 PROCEDURE — 96366 THER/PROPH/DIAG IV INF ADDON: CPT

## 2022-09-21 PROCEDURE — 97167 OT EVAL HIGH COMPLEX 60 MIN: CPT

## 2022-09-21 PROCEDURE — 84484 ASSAY OF TROPONIN QUANT: CPT

## 2022-09-21 PROCEDURE — 80076 HEPATIC FUNCTION PANEL: CPT

## 2022-09-21 PROCEDURE — 1200000000 HC SEMI PRIVATE

## 2022-09-21 PROCEDURE — 6370000000 HC RX 637 (ALT 250 FOR IP): Performed by: HOSPITALIST

## 2022-09-21 PROCEDURE — 97163 PT EVAL HIGH COMPLEX 45 MIN: CPT

## 2022-09-21 PROCEDURE — 80165 DIPROPYLACETIC ACID FREE: CPT

## 2022-09-21 PROCEDURE — 96361 HYDRATE IV INFUSION ADD-ON: CPT

## 2022-09-21 PROCEDURE — 85025 COMPLETE CBC W/AUTO DIFF WBC: CPT

## 2022-09-21 PROCEDURE — 76705 ECHO EXAM OF ABDOMEN: CPT

## 2022-09-21 PROCEDURE — 36415 COLL VENOUS BLD VENIPUNCTURE: CPT

## 2022-09-21 PROCEDURE — 82140 ASSAY OF AMMONIA: CPT

## 2022-09-21 PROCEDURE — 2580000003 HC RX 258: Performed by: HOSPITALIST

## 2022-09-21 PROCEDURE — 97535 SELF CARE MNGMENT TRAINING: CPT

## 2022-09-21 PROCEDURE — 83880 ASSAY OF NATRIURETIC PEPTIDE: CPT

## 2022-09-21 PROCEDURE — 6360000002 HC RX W HCPCS: Performed by: HOSPITALIST

## 2022-09-21 RX ORDER — LACTULOSE 10 G/15ML
20 SOLUTION ORAL DAILY PRN
Status: DISCONTINUED | OUTPATIENT
Start: 2022-09-21 | End: 2022-09-22 | Stop reason: HOSPADM

## 2022-09-21 RX ORDER — MEGESTROL ACETATE 40 MG/1
80 TABLET ORAL 2 TIMES DAILY
Status: DISCONTINUED | OUTPATIENT
Start: 2022-10-09 | End: 2022-09-22 | Stop reason: HOSPADM

## 2022-09-21 RX ORDER — PANTOPRAZOLE SODIUM 40 MG/1
40 TABLET, DELAYED RELEASE ORAL NIGHTLY
Status: DISCONTINUED | OUTPATIENT
Start: 2022-09-21 | End: 2022-09-22 | Stop reason: HOSPADM

## 2022-09-21 RX ORDER — TAMOXIFEN CITRATE 10 MG/1
20 TABLET ORAL 2 TIMES DAILY
Status: DISCONTINUED | OUTPATIENT
Start: 2022-09-21 | End: 2022-09-22 | Stop reason: HOSPADM

## 2022-09-21 RX ADMIN — CEFTRIAXONE SODIUM 1000 MG: 1 INJECTION, POWDER, FOR SOLUTION INTRAMUSCULAR; INTRAVENOUS at 21:22

## 2022-09-21 RX ADMIN — ENOXAPARIN SODIUM 40 MG: 100 INJECTION SUBCUTANEOUS at 10:10

## 2022-09-21 RX ADMIN — CARVEDILOL 25 MG: 25 TABLET, FILM COATED ORAL at 21:08

## 2022-09-21 RX ADMIN — POTASSIUM CHLORIDE 20 MEQ: 1500 TABLET, EXTENDED RELEASE ORAL at 09:59

## 2022-09-21 RX ADMIN — Medication 400 MG: at 21:08

## 2022-09-21 RX ADMIN — METFORMIN HYDROCHLORIDE 1000 MG: 500 TABLET, EXTENDED RELEASE ORAL at 10:00

## 2022-09-21 RX ADMIN — GLIMEPIRIDE 1 MG: 1 TABLET ORAL at 10:01

## 2022-09-21 RX ADMIN — AZITHROMYCIN MONOHYDRATE 500 MG: 500 INJECTION, POWDER, LYOPHILIZED, FOR SOLUTION INTRAVENOUS at 22:10

## 2022-09-21 RX ADMIN — LACTULOSE 20 G: 20 SOLUTION ORAL at 10:10

## 2022-09-21 RX ADMIN — METFORMIN HYDROCHLORIDE 1000 MG: 500 TABLET, EXTENDED RELEASE ORAL at 21:09

## 2022-09-21 RX ADMIN — LISINOPRIL 10 MG: 10 TABLET ORAL at 10:00

## 2022-09-21 RX ADMIN — PANTOPRAZOLE SODIUM 40 MG: 40 TABLET, DELAYED RELEASE ORAL at 10:10

## 2022-09-21 RX ADMIN — CARVEDILOL 25 MG: 25 TABLET, FILM COATED ORAL at 10:00

## 2022-09-21 RX ADMIN — LEVETIRACETAM 500 MG: 500 TABLET ORAL at 09:59

## 2022-09-21 RX ADMIN — SODIUM CHLORIDE: 9 INJECTION, SOLUTION INTRAVENOUS at 00:04

## 2022-09-21 RX ADMIN — TAMOXIFEN CITRATE 20 MG: 10 TABLET ORAL at 10:00

## 2022-09-21 RX ADMIN — ACETAMINOPHEN 650 MG: 325 TABLET, FILM COATED ORAL at 19:03

## 2022-09-21 RX ADMIN — TRAZODONE HYDROCHLORIDE 100 MG: 100 TABLET ORAL at 21:08

## 2022-09-21 RX ADMIN — AZITHROMYCIN MONOHYDRATE 500 MG: 500 INJECTION, POWDER, LYOPHILIZED, FOR SOLUTION INTRAVENOUS at 00:06

## 2022-09-21 RX ADMIN — DIVALPROEX SODIUM 500 MG: 500 TABLET, EXTENDED RELEASE ORAL at 10:00

## 2022-09-21 RX ADMIN — DIVALPROEX SODIUM 500 MG: 500 TABLET, EXTENDED RELEASE ORAL at 21:09

## 2022-09-21 RX ADMIN — GLIMEPIRIDE 1 MG: 1 TABLET ORAL at 16:43

## 2022-09-21 RX ADMIN — TAMOXIFEN CITRATE 20 MG: 10 TABLET ORAL at 21:09

## 2022-09-21 RX ADMIN — LEVETIRACETAM 500 MG: 500 TABLET, FILM COATED ORAL at 21:08

## 2022-09-21 RX ADMIN — DULOXETINE HYDROCHLORIDE 30 MG: 30 CAPSULE, DELAYED RELEASE ORAL at 10:00

## 2022-09-21 ASSESSMENT — PAIN SCALES - WONG BAKER
WONGBAKER_NUMERICALRESPONSE: 0

## 2022-09-21 ASSESSMENT — PAIN SCALES - PAIN ASSESSMENT IN ADVANCED DEMENTIA (PAINAD)
TOTALSCORE: 0
BODYLANGUAGE: 0
FACIALEXPRESSION: 0
BREATHING: 0
BODYLANGUAGE: 0
TOTALSCORE: 0
NEGVOCALIZATION: 0
FACIALEXPRESSION: 0
NEGVOCALIZATION: 0
CONSOLABILITY: 0
BREATHING: 0
CONSOLABILITY: 0

## 2022-09-21 ASSESSMENT — PAIN DESCRIPTION - DESCRIPTORS: DESCRIPTORS: SORE

## 2022-09-21 ASSESSMENT — PAIN SCALES - GENERAL
PAINLEVEL_OUTOF10: 3
PAINLEVEL_OUTOF10: 0
PAINLEVEL_OUTOF10: 0

## 2022-09-21 ASSESSMENT — PAIN DESCRIPTION - LOCATION: LOCATION: JAW

## 2022-09-21 ASSESSMENT — PAIN DESCRIPTION - ORIENTATION: ORIENTATION: RIGHT

## 2022-09-21 ASSESSMENT — PAIN DESCRIPTION - PAIN TYPE: TYPE: CHRONIC PAIN

## 2022-09-21 NOTE — PROGRESS NOTES
Physical Therapy  Facility/Department: [facility] MED SURG  Physical Therapy Initial Assessment    Name: Sameer Shaikh  : 1950  MRN: [id number]  Date of Service: 2022    Discharge Recommendations:  Patient would benefit from continued therapy after discharge   Pt presented to ED on  for altered mental status. All the history is given by her . He states that she did not sleep well last night and today she was not as active or interactive as she normally would be. No fever but she was sweaty. No cough or vomiting. He is worried that she might have a UTI which has happened previously. She is unable to give any history at all, she has dementia. He states normally she will get up and walk around but he was not able to get her walking today. Pt admitted for further medical management of altered mental status    RN reports patient is medically stable for therapy treatment this date. Chart reviewed prior to treatment and patient is agreeable for therapy. Patient Diagnosis(es): The encounter diagnosis was Altered mental status, unspecified altered mental status type. Past Medical History:  has a past medical history of Anxiety, Arteriovenous malformation of liver, Arthritis, Dementia ([facility] [address] Depression, GERD (gastroesophageal reflux disease), HTN (hypertension), Hyperlipidemia, Hyperlipidemia, Insomnia, Malignant neoplasm metastatic to bone [facility]), Malignant neoplasm of endocervix ([facility] [address] OP (osteoporosis), Pneumonia, Seizure ([address] Type 2 diabetes mellitus ([facility] [address] and UTI (urinary tract infection). Past Surgical History:  has a past surgical history that includes Esophagogastroduodenoscopy; bronchoscopy; Robotic assisted hysterectomy; Colonoscopy; and back surgery. Assessment   Body Structures, Functions, Activity Limitations Requiring Skilled Therapeutic Intervention: Decreased functional mobility ; Decreased ADL status; Decreased strength;Decreased endurance;Decreased posture;Decreased safe awareness;Decreased cognition;Decreased balance  Assessment: Pt with deficits of bed mobility, transfers, could only march in place for pre gait with low toe clearance,  balance, cognition, posture, safety awareness and endurance this session,  & required 2 assist for all functional mobility tasks, & is decline compared to prior level of function. With current deficits, Pt HIGH risk for falls & requires continued PT to maximize independence with functional mobility, balance, safety awareness & activity tolerance to improve overall tolerance of ADL's.   Pt currently functioning below baseline with AM-PAC mobility score of 11/24, & recommend daily inpatient skilled therapy at time of discharge to maximize long term outcomes and prevent re-admission  Therapy Prognosis: Good  Decision Making: High Complexity  Exam: ROM, MMT, functional mobility, activity tolerance, Balance, & [address] [address] [facility] 6 Clicks Basic Mobility  Clinical Presentation: evolving  Requires PT Follow-Up: Yes  Activity Tolerance  Activity Tolerance: Patient limited by fatigue;Treatment limited secondary to decreased cognition     Plan   Plan  Plan: 5-7 times per week  Current Treatment Recommendations: Strengthening, Balance training, Functional mobility training, Transfer training, ADL/Self-care training, Endurance training, Gait training, Home exercise program, Safety education & training, Patient/Caregiver education & training, Cognitive reorientation  Safety Devices  Type of Devices: Call light within reach, Chair alarm in place, Gait belt, Heels elevated for pressure relief, Patient at risk for falls, Nurse notified, Left in chair (BLE's elevated in recliner/pillow under to increase overall comfort and prevent skin issues as able; spouse in with pt upon exit)     Restrictions  Restrictions/Precautions  Restrictions/Precautions: General Precautions, Fall Risk  Position Activity Restriction  Other position/activity restrictions: Up as tolerated, ext catheter, B hand IV's, heels off bed at all times, ALARMS, dementia per chart     Subjective   General  Chart Reviewed: Yes  Patient assessed for rehabilitation services?: Yes  Additional Pertinent Hx: OA, dementia, GERD, HTN, osteoporosis, seizures, DM2  Response To Previous Treatment: Not applicable  General Comment  Comments: RN okays PT  Subjective  Subjective: Pt agreeable to PT, denies pain         Social/Functional History  Social/Functional History  Lives With: Spouse  Type of Home: House  Home Layout: Two level, Able to Live on Main level with bedroom/bathroom, 1/2 bath on main level (pt sleeps in sofa bed on main floor; full flight with left ascending rail for bathing once a week)  Bathroom Shower/Tub: Tub/Shower unit (pt primarily does sink bath on main floor but HHA assists shower once a week)  Bathroom Toilet: Handicap height (pt has close vanity)  Bathroom Equipment: Grab bars in shower, Shower chair (pt spouse states she hates showers and only sponge bathes)  Receives Help From: Family, Home health (pt has very supportive spouse, is with her all the time; pt has 2 local daughters & 4 teenage grandchildren who are supportive)  ADL Assistance: Needs assistance (spoue assists with all ADL's + HHA weekly for shower if able)  Homemaking Assistance: Needs assistance (spouse completes I ADL's)  Ambulation Assistance: Needs assistance (R/W household distance & spouse is with pt for safety)  Transfer Assistance: Needs assistance (assisst as needed from spouse)  Active : No  Occupation: Retired  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, babysat  Leisure & Hobbies: rides in car, going to [employer]  IADL Comments: *all info per spouse as pt has dementia  Additional Comments: Spouse states pt fell a week ago when she got up unassisted/thought she was sleeping & spouse found her on floor  Vision/Hearing  Vision  Vision: Impaired  Vision Exceptions: Wears glasses for reading (pt denies vision changes but spouse stated pt gets intermittent floaters; previous B eye cataract sx)  Hearing  Hearing: Exceptions to [facility]  Hearing Exceptions: Hard of hearing/hearing concerns;Bilateral hearing aid (aids not here)    Cognition   Orientation  Overall Orientation Status: Impaired  Orientation Level: Oriented to person;Disoriented to time;Disoriented to place; Disoriented to situation (pt kneww month day of birth but not yeaar of  birth or current month/year)  Cognition  Overall Cognitive Status: Exceptions  Arousal/Alertness: Delayed responses to stimuli  Following Commands: Inconsistently follows commands; Follows one step commands with repetition; Follows one step commands with increased time  Attention Span: Attends with cues to redirect; Difficulty attending to directions; Difficulty dividing attention  Memory: Decreased short term memory;Decreased recall of recent events;Decreased recall of biographical Information;Decreased long term memory  Safety Judgement: Decreased awareness of need for assistance;Decreased awareness of need for safety  Problem Solving: Assistance required to generate solutions;Assistance required to identify errors made;Assistance required to correct errors made;Assistance required to implement solutions;Decreased awareness of errors  Insights: Not aware of deficits  Initiation: Requires cues for all  Sequencing: Requires cues for all     Objective   Heart Rate: 87  Heart Rate Source: Monitor  BP: 136/63  BP Location: Right Arm  MAP (Calculated): 87.33  Resp: 16  SpO2: 94 %  O2 Device: None (Room air)     Observation/Palpation  Observation: pt has L hand IV  Edema: BLE's  Gross Assessment  Sensation: Intact (pt denies paresthesia)     AROM RLE (degrees)  RLE AROM: WFL  AROM LLE (degrees)  LLE AROM : WFL  AROM RUE (degrees)  RUE General AROM: See OT assessment  AROM LUE (degrees)  LUE General AROM: See OT assessment  Strength RLE  Comment: 4-/5  Strength LLE  Comment: 4-/5  Strength DEX  Comment: See OT assessment  Strength GRAZYNA  Comment: See OT assessment        Bed Mobility Training  Bed Mobility Training: No (pt up in reciner upon arrival)  Balance  Sitting:  (Min assist from recliner)  Standing:  (MAX x2 with RW due to increased posterior lean with static stand)  Transfer Training  Transfer Training: Yes  Overall Level of Assistance: Maximum assistance;Assist X2 (sit to stand only from recliner; pt was unable to take steps due to cognitive deficits/increased posterior lean with standing; recommend nursing to use dot andrews)  Interventions: Verbal cues;Demonstration; Safety awareness training;Weight shifting training/pressure relief; Tactile cues  Sit to Stand: Maximum assistance;Assist X2;Additional time  Stand to Sit: Maximum assistance;Assist X2;Additional time  Gait  Overall Level of Assistance:  (not safe or appropriate; pt was unabl to take steps and only completed sit to stand with therapy this date)  Bed mobility  Supine to Sit: Unable to assess  Sit to Supine: Unable to assess  Scooting: Unable to assess  Bed Mobility Comments: unable to assess since pt up in recliner chair at start of visit, returned to chair at end of visit  Transfers  Sit to Stand: Maximum Assistance;2 Person Assistance  Stand to sit: Maximum Assistance;2 Person Assistance  Lateral Transfers: Maximum Assistance;2 Person Assistance  Comment: MAX VC + tactile assist on correct use of upper body for safe sit/stand + to back all way back to surface with walker until she feels touch behind legs & to ensure she reaches with UB support to arms of chair  Ambulation  Surface: level tile  Device: Rolling Walker  Assistance: Maximum assistance;2 Person assistance  Quality of Gait: in place only with low toe clearance, unable to take any forward or sidesteps even with MAX cues, & Max cues needed for upright posture for safety  Gait Deviations: Decreased step length  Distance: in place, WS only     Balance  Posture: Fair  Sitting - Static: Good  Sitting - Dynamic: Good;-  Standing - Static: Fair;- (R/W)  Standing - Dynamic: Poor;+ (R/W)  Single Leg Stance R Le  Single Leg Stance L Le  Exercise Treatment: Pt completed lateral WS seated & completed sit to stands x 2 to promote mobility and functional pre gait activities & completed static standing weight shifts & picking feet up off floor with UB support at R/walker+ MAX assist  to improve core strength & stability  Circulation/Endurance Exercises: ankle pumps x 20     All lines intact, call light within reach, and patient positioned comfortably at end of treatment. All patient needs addressed prior to ending therapy session. AM-PAC Score  AM-PAC Inpatient Mobility Raw Score : 11 (22)  AM-PAC Inpatient T-Scale Score : 33.86 (22)  Mobility Inpatient CMS 0-100% Score: 72.57 (22)  Mobility Inpatient CMS G-Code Modifier : CL (22)      Goals  Short Term Goals  Time Frame for Short term goals: 12 visits  Short term goal 1: Inc bed-mobility & transfers to [facility] 62 to enable pt to safely get in/OOB & chair to return to OF & decrease risk for falls  Short term goal 2: Inc gait to amb 50ft w/ RW  & CGA to enable pt to return to previous level of independence & able to demonstrate indep/ safe use of RW in functional activities including approaching surfaces and turning to sit. Short term goal 3:  Inc strength to [address] standing balance to good with device to facilitate pt independence for performance of ADL's & functional mobility, & reduce fall risk  Short term goal 4: Pt able to tolerate 30 min of activity to include 15-20 reps of ex, NMR & functional mobility with device to facilitate activity tolerance to [facility]  Short term goal 5: Ed pt on home ex's, safety, balance & endurance training, pressure relief with Pt able to demonstrate effective pressure relief techniques with visual cueing, fall prevention, & issue written Pt Ed Education  Patient Education  Education Given To: Patient  Education Provided: Role of Therapy;Plan of Care;Transfer Training;Precautions; Fall Prevention Strategies  Education Provided Comments: Pt educated on purpose of acute PT eval, importance of continued mobility throughout admission, general safety awareness, fall risk prevention, safe transfers & ambulation w/ RW, pressure relief & circulation ex's. prevention of sedentary complications, and PT POC. Pt demonstrated POOR carryover  Pt requires continued reinforcement of education. Education Method: Demonstration;Verbal  Barriers to Learning: Cognition  Education Outcome: Continued education needed      Therapy Time   Individual Concurrent Group Co-treatment   Time In 1010         Time Out 1109         Minutes 61             Additional 10 minutes for chart review  Treatment time: 47 minutes    Co-treatment with OT warranted secondary to decreased safety and independence requiring 2 skilled therapy professionals to address individual discipline's goals. PT addressing core control in transitions with bed mobility & transfers, seated/standing posture, pressure relief, seated & standing postural alignment and breathing techniques, safety/scanning, & fall prevention in ambulation techniques.      [address], PT

## 2022-09-21 NOTE — CONSULTS
GI Consult Note:    Name: Mya Ruano  MRN: [id number]     [place]: [de-identified]  Room: 2025/2025-01    Admit Date: 9/19/2022  PCP: Karley Calderón MD    Physician Requesting Consult: Mars Hampton MD     Reason for Consult:    Change in mental status  History for uterine cancer  Mild elevation of ammonia  Fatty liver  Abdominal discomfort  Weakness and fatigue    Chief Complaint:     Chief Complaint   Patient presents with    Altered Mental Status       History Obtained From:     Patient her  at bedside and EMR    History of Present Illness:      Mya Ruano is a  71 y.o.  female who presents with Altered Mental Status    This is a pleasant 78-year-old  female has history significant of multiple chronic medical issues including uterine cancer apparently the metastasis has been treated with chemotherapy by hematology oncology  She was noticed to have some mental status changes been drowsy and was brought to the emergency room mild elevation of the ammonia was noted subsequently she got admitted apparently also had some dehydration issues  Patient denies any history for liver problems or issues no history for cirrhosis no history for drinking  Her ultrasound has revealed fatty infiltration of the liver  No evidence of overt cirrhosis was noted  She has mild abdominal discomfort  Some mild abdominal gas bloating alternating constipation and diarrhea without any overt bleeding melanotic stools hematemesis coffee-ground emesis  Her medical record charts were all reviewed and discussed with patient's accompanying     Symptoms:  Onset:  Location:  abdomen  Duration:  day(s)  Severity:  mild, moderate  Quality:  intermittent      Past Medical History:     Past Medical History:   Diagnosis Date    Anxiety     Arteriovenous malformation of liver     Arthritis     Dementia ([facility] [address]     Depression     GERD (gastroesophageal reflux disease)     HTN (hypertension)     Hyperlipidemia Hyperlipidemia     Insomnia     Malignant neoplasm metastatic to bone (HCC)     Malignant neoplasm of endocervix (HCC)     OP (osteoporosis)     Pneumonia     Seizure (HCC)     Type 2 diabetes mellitus ([facility] [address]     UTI (urinary tract infection)         Past Surgical History:     Past Surgical History:   Procedure Laterality Date    BACK SURGERY      BRONCHOSCOPY      COLONOSCOPY      ESOPHAGOGASTRODUODENOSCOPY      ROBOTIC ASSISTED HYSTERECTOMY      BSO & LYMPH NODE DISSECTION        Medications Prior to Admission:       Prior to Admission medications    Medication Sig Start Date End Date Taking?  Authorizing Provider   carvedilol (COREG) 25 MG tablet Take 25 mg by mouth 2 times daily (with meals)   Yes Historical Provider, MD   DULoxetine (CYMBALTA) 30 MG extended release capsule Take 30 mg by mouth daily   Yes Historical Provider, MD   glimepiride (AMARYL) 1 MG tablet Take 1 mg by mouth 2 times daily (with meals)   Yes Historical Provider, MD   lisinopril (PRINIVIL;ZESTRIL) 10 MG tablet Take 10 mg by mouth daily   Yes Historical Provider, MD   pantoprazole (PROTONIX) 40 MG tablet Take 40 mg by mouth daily   Yes Historical Provider, MD   potassium chloride (KLOR-CON M) 20 MEQ extended release tablet Take 20 mEq by mouth daily   Yes Historical Provider, MD   simvastatin (ZOCOR) 20 MG tablet Take 20 mg by mouth nightly   Yes Historical Provider, MD   tamoxifen (NOLVADEX) 20 MG tablet Take 20 mg by mouth 2 times daily Indications: alternates with megestrol 80mg BID every 3 weeks, just started tamoxifen on 9/18/22   Yes Historical Provider, MD   levETIRAcetam (KEPPRA) 1000 MG tablet Take 500 mg by mouth every morning Indications: transitioning off keppra - 500mg BID from 9/21-9/27 then 500mg daily from 9/28-10/4 then off   Yes Historical Provider, MD   levETIRAcetam (KEPPRA) 1000 MG tablet Take 1,000 mg by mouth every evening Indications: transitioning off keppra - 500mg BID from 9/21-9/27 then 500mg daily from 9/28-10/4 then off   Yes Historical Provider, MD   metFORMIN (GLUCOPHAGE-XR) 500 MG extended release tablet Take 1,000 mg by mouth in the morning and at bedtime   Yes Historical Provider, MD   traZODone (DESYREL) 100 MG tablet Take 100 mg by mouth nightly   Yes Historical Provider, MD   Probiotic Product (PROBIOTIC ADVANCED PO) Take 1 tablet by mouth daily   Yes Historical Provider, MD   GINKGO BILOBA EXTRACT PO Take 1 tablet by mouth daily   Yes Historical Provider, MD   Magnesium 500 MG TABS Take 500 mg by mouth 2 times daily   Yes Historical Provider, MD   Multiple Vitamins-Minerals (THERAPEUTIC MULTIVITAMIN-MINERALS) tablet Take 1 tablet by mouth daily   Yes Historical Provider, MD   acetaminophen (TYLENOL) 500 MG tablet Take 500 mg by mouth every 6 hours as needed for Pain   Yes Historical Provider, MD   Coenzyme Q10 (COQ10) 100 MG CAPS Take 100 mg by mouth daily   Yes Historical Provider, MD   APPLE CIDER VINEGAR PO Take 1 tablet by mouth daily   Yes Historical Provider, MD   docusate sodium (COLACE) 100 MG capsule Take 100 mg by mouth 2 times daily as needed for Constipation   Yes Historical Provider, MD   aluminum & magnesium hydroxide-simethicone (MAALOX) 200-200-20 MG/5ML SUSP suspension Take 5 mLs by mouth every 6 hours as needed for Indigestion   Yes Historical Provider, MD   vitamin B-12 (CYANOCOBALAMIN) 1000 MCG tablet Take 1,000 mcg by mouth daily   Yes Historical Provider, MD   Melatonin 10 MG TABS Take 10 mg by mouth nightly as needed (sleep)   Yes Historical Provider, MD   ondansetron (ZOFRAN-ODT) 4 MG disintegrating tablet Take 4 mg by mouth every 8 hours as needed for Nausea or Vomiting   Yes Historical Provider, MD   megestrol (MEGACE) 40 MG tablet Take 80 mg by mouth 2 times daily Indications: alternates with tamoxifen 20mg BID every 3 weeks, next megestrol dose due 10/9/22   Yes Historical Provider, MD   divalproex (DEPAKOTE) 500 MG DR tablet Take 500 mg by mouth 2 times daily Indications: started on 22, patient currently transitioning off [address] - last dose will be 10/5/22   Yes Historical Provider, MD        Allergies:       Ciprofloxacin, Pcn [penicillins], and Sulfa antibiotics    Social History:     Tobacco:    reports that she has never smoked. She has never used smokeless tobacco.  Alcohol:      reports no history of alcohol use. Drug Use:  reports no history of drug use. Family History:     No family history on file.     Review of Systems:     Positive and Negative as described in HPI    Constitutional:  negative for  fevers, chills, sweats, positive positive mild fatigue, and weight loss  HEENT:  negative for vision or hearing changes,   Respiratory:  negative for shortness of breath, cough, or congestion  Cardiovascular:  negative for  chest pain, palpitations  Gastrointestinal: Positive nausea, vomiting, diarrhea, constipation, positive abdominal pain  Genitourinary:  negative for frequency, dysuria  Integument:  negative for rash, skin lesions  Musculoskeletal: Positive  for muscle aches or joint pain  Neurological: Positive headaches, dizziness, lightheadedness, numbness, pain and tingling extrimities  Behavior/Psych:  negative for depression and positive anxiety    Code Status:  Full Code    Physical Exam:     Vitals:  /63   Pulse 87   Temp 97.8 °F (36.6 °C) (Oral)   Resp 16   Ht 5' 2\" (1.575 m)   Wt 193 lb 3.2 oz (87.6 kg)   SpO2 94%   BMI 35.34 kg/m²   Temp (24hrs), Av.1 °F (36.7 °C), Min:97.8 °F (36.6 °C), Max:98.3 °F (36.8 °C)      General appearance - alert, mild to moderate obesity sick appearing, and in no acute distress  Mental status - oriented to person, place, and time with anxious affect  Head - normocephalic and atraumatic  Eyes -he has some ice discomfort with repeated blinking and squeezing of her eyelids   Ears - hearing appears to be intact  Nose - no drainage noted  Mouth - mucous membranes moist  Neck - supple, no carotid bruits, thyroid not palpable  Chest -bilateral Rales to auscultation, normal effort  Heart -checked at this time   abdomen - soft, mild lower abdominal tenderness, nondistended, bowel sounds present all four quadrants, no masses, hepatomegaly or splenomegaly.  No hernias  Neurological -very slow and weak speech, no focal findings or movement disorder noted, cranial nerves not done at this time mild II through XII grossly intact  Extremities -mild  no pedal edema or calf pain with palpation  Skin -positive induration noted  Cranial Nerves : Not done at this time  Lymph nodes: not done at this time    Data:   CBC:   Lab Results   Component Value Date/Time    WBC 5.3 09/21/2022 06:17 AM    RBC 3.06 09/21/2022 06:17 AM    HGB 9.6 09/21/2022 06:17 AM    HCT 30.0 09/21/2022 06:17 AM    MCV 98.0 09/21/2022 06:17 AM    MCH 31.4 09/21/2022 06:17 AM    MCHC 32.0 09/21/2022 06:17 AM    RDW 16.5 09/21/2022 06:17 AM     09/21/2022 06:17 AM    MPV 9.5 09/21/2022 06:17 AM     CBC with Differential:    Lab Results   Component Value Date/Time    WBC 5.3 09/21/2022 06:17 AM    RBC 3.06 09/21/2022 06:17 AM    HGB 9.6 09/21/2022 06:17 AM    HCT 30.0 09/21/2022 06:17 AM     09/21/2022 06:17 AM    MCV 98.0 09/21/2022 06:17 AM    MCH 31.4 09/21/2022 06:17 AM    MCHC 32.0 09/21/2022 06:17 AM    RDW 16.5 09/21/2022 06:17 AM    LYMPHOPCT 28 09/21/2022 06:17 AM    MONOPCT 10 09/21/2022 06:17 AM    BASOPCT 0 09/21/2022 06:17 AM    MONOSABS 0.54 09/21/2022 06:17 AM    LYMPHSABS 1.50 09/21/2022 06:17 AM    EOSABS 0.16 09/21/2022 06:17 AM    BASOSABS <0.03 09/21/2022 06:17 AM     Hemoglobin/Hematocrit:    Lab Results   Component Value Date/Time    HGB 9.6 09/21/2022 06:17 AM    HCT 30.0 09/21/2022 06:17 AM     CMP:    Lab Results   Component Value Date/Time     09/21/2022 06:17 AM    K 3.9 09/21/2022 06:17 AM     09/21/2022 06:17 AM    CO2 23 09/21/2022 06:17 AM    BUN 6 09/21/2022 06:17 AM    CREATININE 0.42 09/21/2022 06:17 AM    GFRAA >60 09/21/2022 06:17 AM    LABGLOM >60 09/21/2022 06:17 AM    GLUCOSE 101 09/21/2022 06:17 AM    PROT 4.8 09/21/2022 06:17 AM    LABALBU 3.0 09/21/2022 06:17 AM    CALCIUM 8.1 09/21/2022 06:17 AM    BILITOT 0.1 09/21/2022 06:17 AM    ALKPHOS 39 09/21/2022 06:17 AM    AST 13 09/21/2022 06:17 AM    ALT 10 09/21/2022 06:17 AM     BMP:    Lab Results   Component Value Date/Time     09/21/2022 06:17 AM    K 3.9 09/21/2022 06:17 AM     09/21/2022 06:17 AM    CO2 23 09/21/2022 06:17 AM    BUN 6 09/21/2022 06:17 AM    LABALBU 3.0 09/21/2022 06:17 AM    CREATININE 0.42 09/21/2022 06:17 AM    CALCIUM 8.1 09/21/2022 06:17 AM    GFRAA >60 09/21/2022 06:17 AM    LABGLOM >60 09/21/2022 06:17 AM    GLUCOSE 101 09/21/2022 06:17 AM     PT/INR:  No results found for: PROTIME, INR  PTT:  No results found for: APTT, PTT[APTT}    Assesment:     Primary Problem  AMS (altered mental status)    Active Hospital Problems    Diagnosis Date Noted    AMS (altered mental status) [R41.82] 09/19/2022     Priority: Medium     Change in mental status  History for uterine cancer  Mild elevation of ammonia  Fatty liver  Abdominal discomfort  Weakness and fatigue  Plan:     Nonspecific elevation of ammonia no overt signs of cirrhosis of the liver  Will check and follow-up LFTs  Will run hepatitis screen  Continue hydration  Recheck ammonia in the morning  Supportive symptomatic care  We will reevaluate in the morning discussed with patient's   Will check Depakote level        Thank you for allowing me to participate in the care of your patient. Please feel free to contact me with any questions or concerns.      Electronically signed by Rayo Jeffries MD on 9/21/2022 at 12:48 PM     Copy sent to Dr. Dary Valdez MD

## 2022-09-21 NOTE — PROGRESS NOTES
Neurology update: The patient's EEG shows generalized encephalopathy-in case of this patient, likely due to her underlying dementia and other, current toxic/metabolic abnormalities. CT head without contrast shows no acute findings. Recommendations:    No new or further recommendations at this time. Please continue with supportive care and, outpatient neurology follow-up was recommended, per discretion of the primary neurologist and the patient as well as per discretion of family. Further seizure and dementia related recommendations per discretion of patient's outpatient neurologist.    Further recommendations related to this admission, per discretion of the primary team.    Neurology service is signing off. Please recall us if/as needed. Thank you.

## 2022-09-21 NOTE — CARE COORDINATION
Social work: pt spouse did sign the IMM letter today and was informed pt is now in patient. He was provided a copy. Spouse said when he spoke to therapy they suggested short term rehab might be an option. She was last in snf in July per spouse at [facility]. He stated that one daughter lives in [facility] and another one is close. He is retired so that is ok with him also. He is closer to [facility] so if a bed is not open there he would be ok with either. Due to dementia perhaps sunset [facility] would also be an option. Will check for beds at those 3 places and keep him updated.   [facility]

## 2022-09-21 NOTE — PROGRESS NOTES
Occupational Therapy  Facility/Department: [facility] MED SURG  Occupational Therapy Initial Assessment    Name: Liz Armstrong  : 1950  MRN: [id number]  Date of Service: 2022    GRACY LEWIS reports patient is medically stable for therapy treatment this date. Chart reviewed prior to treatment and patient is agreeable for therapy. All lines intact and patient positioned comfortably at end of treatment. All patient needs addressed prior to ending therapy session. Pt currently functioning below baseline. Recommend daily inpatient skilled therapy at time of discharge to maximize long term outcomes and prevent re-admission. Please refer to AM-PAC score for current level of function. Discharge Recommendations:  Patient would benefit from continued therapy after discharge  OT Equipment Recommendations  Equipment Needed: Yes  Mobility Devices: ADL Assistive Devices  ADL Assistive Devices: Toileting - 3-in-1 Commode;Long-handled Shoe [place];Long-handled Sponge;Emergency Alert System (bedside commode use)       Patient Diagnosis(es): The encounter diagnosis was Altered mental status, unspecified altered mental status type. Past Medical History:  has a past medical history of Anxiety, Arteriovenous malformation of liver, Arthritis, Dementia ([address] Depression, GERD (gastroesophageal reflux disease), HTN (hypertension), Hyperlipidemia, Hyperlipidemia, Insomnia, Malignant neoplasm metastatic to bone [facility]), Malignant neoplasm of endocervix ([facility] OP (osteoporosis), Pneumonia, Seizure ([address] Type 2 diabetes mellitus ([facility] [address] and UTI (urinary tract infection). Past Surgical History:  has a past surgical history that includes Esophagogastroduodenoscopy; bronchoscopy; Robotic assisted hysterectomy; Colonoscopy; and back surgery. PER  H&P:    Liz Armstrong is a 71 y.o.  female who presents with Altered Mental Status    72-year-old lady was brought to the ER by her .   There was concern the patient was not acting right. Per report, patient abruptly sat down and began to shake. Initially has been was concerned if she has had seizure. Patient has been intermittently confused and disoriented. Also complaining of very weak.  is concerned that usually when she gets infection she  Back. Patient has Alzheimer's dementia. She denies any chest pain, cough, cold, changes in urination, bowel habit or rash. On presentation hemoglobin was 11.1. UA was significant for trace leukocyte Estrace. X-ray chest shows cardiomegaly with vascular crowding and mild pulmonary vascular congestion. Bibasilar atelectasis or infiltrate crowding left with possible small left effusion. CT brain did not show any acute changes. Assessment   Performance deficits / Impairments: Decreased functional mobility ; Decreased safe awareness;Decreased balance;Decreased coordination;Decreased ADL status; Decreased cognition;Decreased vision/visual deficit; Decreased posture;Decreased endurance;Decreased high-level IADLs;Decreased strength  Assessment: Pt is a high fall risk, has cognitive deficits/poor safety awareness, and due to overall weakness/balance/act gerald issues requires assist of 2 staff when up. Skilled OT is indicated to maximize functional I and safety as able to reduce caregiver assist/burden.   Prognosis: Fair  Decision Making: High Complexity  REQUIRES OT FOLLOW-UP: Yes  Activity Tolerance  Activity Tolerance: Treatment limited secondary to decreased cognition;Patient limited by fatigue  Activity Tolerance Comments: poor-poor plus; stand gerald 1-2 mins with RW        Plan   Plan  Times per Week: 4-5x/week 1x/day as gerald  Current Treatment Recommendations: Strengthening, Balance training, ROM, Functional mobility training, Endurance training, Cognitive reorientation, Equipment evaluation, education, & procurement, Neuromuscular re-education, Safety education & training, Self-Care / ADL, Home management training, Cognitive/Perceptual training, Coordination training, Positioning     Restrictions  Restrictions/Precautions  Restrictions/Precautions: General Precautions, Fall Risk  Position Activity Restriction  Other position/activity restrictions: Up as tolerated, ext catheter, B hand IV's, heels off bed at all times, ALARMS, dementia per chart    Subjective   General  Chart Reviewed: Yes  Patient assessed for rehabilitation services?: Yes  Family / Caregiver Present: Yes (spouse David Fisher)  Subjective  Subjective: Pt denies pain     Social/Functional History  Social/Functional History  Lives With: Spouse  Type of Home: House  Home Layout: Two level, Able to Live on Main level with bedroom/bathroom, 1/2 bath on main level (pt sleeps in sofa bed on main floor; full flight with left ascending rail for bathing once a week)  Bathroom Shower/Tub: Tub/Shower unit (spouse states sponge bath on main floor but HHA assists with shower once a week)  Bathroom Toilet: Handicap height (pt has close vanity)  Bathroom Equipment: Grab bars in shower, Shower chair (pt spouse states she hates showers/sponge bathes except for shower once a week if HHA is able to convince her to complete)  Receives Help From: Family, Home health (pt has very supportive spouse/states her is with t 24/7; pt has 2 local daughters & 4 teenage grandchildren who are also supportive)  ADL Assistance: Needs assistance (spouse assists with all ADL's + HHA weekly for shower if able)  Homemaking Assistance: Needs assistance (spouse completes I ADL's)  Ambulation Assistance: Needs assistance (RW household distance & spouse is with pt for safety)  Transfer Assistance: Needs assistance (assisst as needed from spouse; spouse states pt's functional status fluctuates daily due to cognitive deficits and sleeping issues)  Active : No  Occupation: Retired  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, babysat  Leisure & Hobbies: rides in car, going to [address]: *all info per spouse as pt has dementia  Additional Comments: Spouse states pt fell a week ago when she got up unassisted/thought she was sleeping & spouse found her on floor       Objective          Observation/Palpation  Posture: Poor (increased posterior lean with RW)  Observation: pt has L hand IV; max lethargy noted and max arousals and redirection needed throughout session to maintain alertness  Edema: BLE's  Safety Devices  Type of Devices: Call light within reach; Chair alarm in place;Gait belt; Heels elevated for pressure relief;Patient at risk for falls;Nurse notified; Left in chair (BLE's elevated in recliner/pillow under to increase overall comfort and prevent skin issues as able; spouse in with pt upon exit)  \  Bed Mobility Training  Bed Mobility Training: No (pt up in reciner upon arrival)    Balance  Sitting:  (Min assist seated in recliner)  Standing:  (MAX x2 with RW due to increased posterior lean with static stand)  Transfer Training  Transfer Training: Yes  Overall Level of Assistance: Maximum assistance;Assist X2 (sit to stand only from recliner; pt was unable to take steps due to cognitive deficits/increased posterior lean with standing; recommend nursing to use dot andrews)  Interventions: Verbal cues;Demonstration; Safety awareness training;Weight shifting training; Tactile cues(Max cues/tactile assist and demo/hand over hand assist with B hand placement pushing from surface seated on/reaching back, nose over toes tech, upright posture/weight shifting and widening JACINTA, scanning, controlled stand to sit vs plopping to increase overall safety/reduce falls.    Sit to Stand: Maximum assistance;Assist X2;Additional time  Stand to Sit: Maximum assistance;Assist X2;Additional time      Functional mob   Overall Level of Assistance:  (not safe or appropriate; pt was unabl to take steps with therapy and only completed sit to stand from recliner this date)     AROM: Within functional limits (with max cues/demo for ROM testing)  Strength: Generally decreased, functional (BUE strength grossly 4/5 and max cues/demo for MMT)  Coordination: Generally decreased, functional (very slow and delayed B hand opposition)  Tone: Normal  Sensation:  (pt denies BUE paresthesias in BUE's and LE's)      ADL  Feeding: Setup; Moderate assistance;Minimal assistance (with drinking 2 sips of H2O and taking 2 bites of applesauce while seated in recliner; max edu and encouragement needed)  Grooming: Setup;Maximum assistance (for hair care seated in recliner; pt was able to wash her face with min assist/hand over hand for initiation of task)  UE Bathing: Setup;Maximum assistance  LE Bathing: Setup;Dependent/Total  UE Dressing: Setup;Maximum assistance (with hosp gown)  LE Dressing: Setup;Maximum assistance;Dependent/Total (max assist with doffing sandals/donning clean  socks to increase safety with proper footwear on when up)  Toileting: Dependent/Total  Additional Comments: Pt is DEP and requires x2 staff assist when up in function for safety/balance support. Vision  Vision: Impaired  Vision Exceptions: Wears glasses for reading (pt denies vision changes but spouse stated pt gets intermittent floaters; spouse states previous B eye cataract sx)  Hearing  Hearing: Exceptions to [facility]  Hearing Exceptions: Hard of hearing/hearing concerns;Bilateral hearing aid (aids not here per spouse)  Cognition  Overall Cognitive Status: Exceptions  Arousal/Alertness: Delayed responses to stimuli  Following Commands: Inconsistently follows commands; Follows one step commands with repetition; Follows one step commands with increased time  Attention Span: Attends with cues to redirect; Difficulty attending to directions; Difficulty dividing attention  Memory: Decreased short term memory;Decreased recall of recent events;Decreased recall of biographical Information;Decreased long term memory  Safety Judgement: Decreased awareness of need for assistance;Decreased rails.  Short Term Goal 3: ADL transfers and functional mob with AD as needed to max assist of 1. Short Term Goal 4: participation in [address] [place] Ex/Act as able for 15 mins in appropriate position to maintain functional use. Short Term Goal 5: toileting tasks with use of BSC/AD and grab bar to max assist of 1. Long Term Goals  Long Term Goal 1: Pt to stand with min assist of 1 and AD gerald > 5 mins as able to reduce falls. Long Term Goal 2: Caregivers to be I with pressure relief/proper positioning, BUE HEP, EC/WS and fall prevention tech as well as DME/AE recommendations with use of handouts.   Patient Goals   Patient goals : Pt unable to state due to cognitive defiicts; pt's spouse would like pt to be able to return home       Therapy Time   Individual Concurrent Group Co-treatment   Time In 1000 (plus 10 min chart review/nursing communication)         Time Out 1059         Minutes 59=69 mins total          Timed Code Treatment Minutes: [address] [place] , OT

## 2022-09-21 NOTE — PLAN OF CARE
Problem: Discharge Planning  Goal: Discharge to home or other facility with appropriate resources  Outcome: Progressing  Flowsheets (Taken 9/21/2022 0955)  Discharge to home or other facility with appropriate resources:   Identify barriers to discharge with patient and caregiver   Arrange for needed discharge resources and transportation as appropriate   Identify discharge learning needs (meds, wound care, etc)   Refer to discharge planning if patient needs post-hospital services based on physician order or complex needs related to functional status, cognitive ability or social support system     Problem: Pain  Goal: Verbalizes/displays adequate comfort level or baseline comfort level  9/21/2022 1447 by Kathy Nice RN  Outcome: Progressing  Flowsheets (Taken 9/21/2022 0955)  Verbalizes/displays adequate comfort level or baseline comfort level:   Assess pain using appropriate pain scale   Administer analgesics based on type and severity of pain and evaluate response   Implement non-pharmacological measures as appropriate and evaluate response   Notify Licensed Independent Practitioner if interventions unsuccessful or patient reports new pain     Problem: Safety - Adult  Goal: Free from fall injury  9/21/2022 1447 by Kathy Nice RN  Outcome: Progressing  Flowsheets (Taken 9/21/2022 1438)  Free From Fall Injury: Instruct family/caregiver on patient safety     Problem: Neurosensory - Adult  Goal: Absence of seizures  Outcome: Progressing  Flowsheets (Taken 9/21/2022 0955)  Absence of seizures:   Monitor for seizure activity.   If seizure occurs, document type and location of movements and any associated apnea   If seizure occurs, turn head to side and suction secretions as needed   Administer anticonvulsants as ordered   Support airway/breathing, administer oxygen as needed   Diagnostic studies as ordered     Problem: Neurosensory - Adult  Goal: Remains free of injury related to seizures activity  Outcome: Progressing  Flowsheets (Taken 9/21/2022 0955)  Remains free of injury related to seizure activity:   Maintain airway, patient safety  and administer oxygen as ordered   Monitor patient for seizure activity, document and report duration and description of seizure to Licensed Independent Practitioner   If seizure occurs, turn patient to side and suction secretions as needed   Reorient patient post seizure   Seizure pads on all 4 side rails   Instruct patient/family to notify RN of any seizure activity   Instruct patient/family to call for assistance with activity based on assessment     Problem: Neurosensory - Adult  Goal: Achieves maximal functionality and self care  9/21/2022 1447 by Kathy Nice RN  Outcome: Progressing  Flowsheets (Taken 9/21/2022 0955)  Achieves maximal functionality and self care:   Monitor swallowing and airway patency with patient fatigue and changes in neurological status   Encourage and assist patient to increase activity and self care with guidance from physical therapy/occupational therapy   Encourage visually impaired, hearing impaired and aphasic patients to use assistive/communication devices     Problem: Skin/Tissue Integrity - Adult  Goal: Skin integrity remains intact  9/21/2022 1447 by Kathy Nice RN  Outcome: Progressing  Flowsheets (Taken 9/21/2022 0955)  Skin Integrity Remains Intact: Monitor for areas of redness and/or skin breakdown     Problem: Skin/Tissue Integrity - Adult  Goal: Oral mucous membranes remain intact  Outcome: Progressing  Flowsheets (Taken 9/21/2022 0955)  Oral Mucous Membranes Remain Intact:   Assess oral mucosa and hygiene practices   Implement preventative oral hygiene regimen     Problem: Infection - Adult  Goal: Absence of infection at discharge  Outcome: Progressing  Flowsheets (Taken 9/21/2022 0955)  Absence of infection at discharge:   Assess and monitor for signs and symptoms of infection   Monitor lab/diagnostic results   Instruct and encourage to provide reality reorientation, refocusing and direction   Decrease environmental stimuli, including noise as appropriate   Monitor and intervene to maintain adequate nutrition, hydration, elimination, sleep and activity   If unable to ensure safety without constant attention obtain sitter and review sitter guidelines with assigned personnel     Problem: Chronic Conditions and Co-morbidities  Goal: Patient's chronic conditions and co-morbidity symptoms are monitored and maintained or improved  9/21/2022 1447 by Gayla Harrison RN  Outcome: Progressing  Flowsheets (Taken 9/21/2022 0964)  Care Plan - Patient's Chronic Conditions and Co-Morbidity Symptoms are Monitored and Maintained or Improved:   Monitor and assess patient's chronic conditions and comorbid symptoms for stability, deterioration, or improvement   Collaborate with multidisciplinary team to address chronic and comorbid conditions and prevent exacerbation or deterioration   Update acute care plan with appropriate goals if chronic or comorbid symptoms are exacerbated and prevent overall improvement and discharge     Problem: Skin/Tissue Integrity  Goal: Absence of new skin breakdown  Description: 1. Monitor for areas of redness and/or skin breakdown  2. Assess vascular access sites hourly  3. Every 4-6 hours minimum:  Change oxygen saturation probe site  4. Every 4-6 hours:  If on nasal continuous positive airway pressure, respiratory therapy assess nares and determine need for appliance change or resting period. Outcome: Progressing  Note: Skin assessment ongoing. Pressure ulcer preventions being practiced - see charting for details. Patient turned every two hours.        Problem: ABCDS Injury Assessment  Goal: Absence of physical injury  Outcome: Progressing  Flowsheets (Taken 9/21/2022 0257)  Absence of Physical Injury: Implement safety measures based on patient assessment

## 2022-09-21 NOTE — CARE COORDINATION
Social work: spoke to [employer] they are going to review the PT/OT notes and progress notes. If they do not have a bed the spouse would accept [facility]. Will need josefina, Rx and discharge order for snf at discharge. Jeri solorzano    Social work: met with spouse again he is ok with [facility]. [facility] is full today. Await Zander's call back. Abhay started.  Jeri solorzano

## 2022-09-21 NOTE — CARE COORDINATION
Social work: [facility] is able to accept at discharge. Need HENS prior to admit.

## 2022-09-21 NOTE — PROGRESS NOTES
Progress note  [facility]    Adult Hospitalist      Name: Vincent Ortiz  MRN: [id number]     Heidyide: [de-identified]  Room: 2025/2025-01    Admit Date: 9/19/2022 11:47 AM  PCP: Nino Reddy MD    Primary Problem  Principal Problem:    AMS (altered mental status)  Active Problems:    Increased ammonia level    Weakness  Resolved Problems:    * No resolved hospital problems. *        Assesment:     Concern for Seizure-ruled out  Hyperammonemia-improved  Acute metabolic encephalopathy-multifactorial  Bacteriuria  Left pleural effusion  Pulmonary vascular congestion/cardiomegaly  Bibasilar infiltrate/pneumonia-likely bacterial  Dementia          Plan:     Admitted to [facility] telemetry  O2 to maintain oxygen saturation greater than 92%    Serial troponin  BNP  Echocardiogram shows mild left ventricular hypertrophy, EF 65%    Procalcitonin  Discontinue IV ceftriaxone and Zithromax  Monitor respiratory status  Urine culture no growth so far  Repeat chest x-ray      Monitor mental status  Ammonia: 55  Liver ultrasound did not show liver cirrhosis  Liver panel  GI consult, recommended hepatitis panel      Neurology consult, EEG shows generalized encephalopathy. Signed off    Continue to monitor/telemetry/CBC with differential daily/BMP daily  DVT and GI prophylaxis.   Continue medications as below    Discharge planning to rehab      Scheduled Meds:   pantoprazole  40 mg Oral Nightly    lactulose  20 g Oral TID    carvedilol  25 mg Oral BID    DULoxetine  30 mg Oral Daily    lisinopril  10 mg Oral Daily    potassium chloride  20 mEq Oral Daily    tamoxifen  20 mg Oral BID    metFORMIN  1,000 mg Oral BID    traZODone  100 mg Oral Nightly    Magnesium  500 mg Oral BID    therapeutic multivitamin-minerals  1 tablet Oral Daily    CoQ10  100 mg Oral Daily    vitamin B-12  1,000 mcg Oral Daily    megestrol  80 mg Oral BID    divalproex  500 mg Oral BID    glimepiride  1 mg Oral BID AC    levETIRAcetam  500 mg Oral BID Followed by    Kaila Alvarez ON 9/28/2022] levETIRAcetam  500 mg Oral Daily    sodium chloride flush  10 mL IntraVENous 2 times per day    enoxaparin  40 mg SubCUTAneous Daily    cefTRIAXone (ROCEPHIN) IV  1,000 mg IntraVENous Q24H    azithromycin  500 mg IntraVENous Q24H     Continuous Infusions:   dextrose      sodium chloride 75 mL/hr at 09/21/22 0004    sodium chloride       PRN Meds:  docusate sodium, 100 mg, BID PRN  Melatonin, 10 mg, Nightly PRN  glucose, 4 tablet, PRN  dextrose bolus, 125 mL, PRN   Or  dextrose bolus, 250 mL, PRN  glucagon (rDNA), 1 mg, PRN  dextrose, , Continuous PRN  sodium chloride flush, 10 mL, PRN  sodium chloride, , PRN  potassium chloride, 40 mEq, PRN   Or  potassium alternative oral replacement, 40 mEq, PRN   Or  potassium chloride, 10 mEq, PRN  magnesium sulfate, 1,000 mg, PRN  ondansetron, 4 mg, Q8H PRN   Or  ondansetron, 4 mg, Q6H PRN  senna, 1 tablet, BID PRN  acetaminophen, 650 mg, Q6H PRN   Or  acetaminophen, 650 mg, Q6H PRN    Chief Complaint:     Chief Complaint   Patient presents with    Altered Mental Status         History of Present Illness:      Patient seen examined at bedside. Patient was sleepy. No significant change in her mental status. Patient has underlying dementia. She is more calm. Afebrile  No acute events reported    HPI:        Tyler Avery is a 71 y.o.  female who presents with Altered Mental Status    78-year-old lady was brought to the ER by her . There was concern the patient was not acting right. Per report, patient abruptly sat down and began to shake. Initially has been was concerned if she has had seizure. Patient has been intermittently confused and disoriented. Also complaining of very weak.  is concerned that usually when she gets infection she  Back. Patient has Alzheimer's dementia. She denies any chest pain, cough, cold, changes in urination, bowel habit or rash. On presentation hemoglobin was 11.1.   UA was significant for trace leukocyte Estrace. X-ray chest shows cardiomegaly with vascular crowding and mild pulmonary vascular congestion. Bibasilar atelectasis or infiltrate crowding left with possible small left effusion. CT brain did not show any acute changes. I have personally reviewed the past medical history, past surgical history, medications, social history, and family history, and summarized in the note. Review of Systems:     All 10 point system is reviewed and negative otherwise mentioned in HPI. Past Medical History:     Past Medical History:   Diagnosis Date    Anxiety     Arteriovenous malformation of liver     Arthritis     Dementia ([facility] [address]     Depression     GERD (gastroesophageal reflux disease)     HTN (hypertension)     Hyperlipidemia     Hyperlipidemia     Insomnia     Malignant neoplasm metastatic to bone (HCC)     Malignant neoplasm of endocervix (HCC)     OP (osteoporosis)     Pneumonia     Seizure (HCC)     Type 2 diabetes mellitus ([facility] [address]     UTI (urinary tract infection)         Past Surgical History:     Past Surgical History:   Procedure Laterality Date    BACK SURGERY      BRONCHOSCOPY      COLONOSCOPY      ESOPHAGOGASTRODUODENOSCOPY      ROBOTIC ASSISTED HYSTERECTOMY      BSO & LYMPH NODE DISSECTION        Medications Prior to Admission:       Prior to Admission medications    Medication Sig Start Date End Date Taking?  Authorizing Provider   carvedilol (COREG) 25 MG tablet Take 25 mg by mouth 2 times daily (with meals)   Yes Historical Provider, MD   DULoxetine (CYMBALTA) 30 MG extended release capsule Take 30 mg by mouth daily   Yes Historical Provider, MD   glimepiride (AMARYL) 1 MG tablet Take 1 mg by mouth 2 times daily (with meals)   Yes Historical Provider, MD   lisinopril (PRINIVIL;ZESTRIL) 10 MG tablet Take 10 mg by mouth daily   Yes Historical Provider, MD   pantoprazole (PROTONIX) 40 MG tablet Take 40 mg by mouth daily   Yes Historical Provider, MD   potassium chloride (KLOR-CON M) 20 MEQ extended release tablet Take 20 mEq by mouth daily   Yes Historical Provider, MD   simvastatin (ZOCOR) 20 MG tablet Take 20 mg by mouth nightly   Yes Historical Provider, MD   tamoxifen (NOLVADEX) 20 MG tablet Take 20 mg by mouth 2 times daily Indications: alternates with megestrol 80mg BID every 3 weeks, just started tamoxifen on 9/18/22   Yes Historical Provider, MD   levETIRAcetam (KEPPRA) 1000 MG tablet Take 500 mg by mouth every morning Indications: transitioning off keppra - 500mg BID from 9/21-9/27 then 500mg daily from 9/28-10/4 then off   Yes Historical Provider, MD   levETIRAcetam (KEPPRA) 1000 MG tablet Take 1,000 mg by mouth every evening Indications: transitioning off keppra - 500mg BID from 9/21-9/27 then 500mg daily from 9/28-10/4 then off   Yes Historical Provider, MD   metFORMIN (GLUCOPHAGE-XR) 500 MG extended release tablet Take 1,000 mg by mouth in the morning and at bedtime   Yes Historical Provider, MD   traZODone (DESYREL) 100 MG tablet Take 100 mg by mouth nightly   Yes Historical Provider, MD   Probiotic Product (PROBIOTIC ADVANCED PO) Take 1 tablet by mouth daily   Yes Historical Provider, MD   GINKGO BILOBA EXTRACT PO Take 1 tablet by mouth daily   Yes Historical Provider, MD   Magnesium 500 MG TABS Take 500 mg by mouth 2 times daily   Yes Historical Provider, MD   Multiple Vitamins-Minerals (THERAPEUTIC MULTIVITAMIN-MINERALS) tablet Take 1 tablet by mouth daily   Yes Historical Provider, MD   acetaminophen (TYLENOL) 500 MG tablet Take 500 mg by mouth every 6 hours as needed for Pain   Yes Historical Provider, MD   Coenzyme Q10 (COQ10) 100 MG CAPS Take 100 mg by mouth daily   Yes Historical Provider, MD   APPLE CIDER VINEGAR PO Take 1 tablet by mouth daily   Yes Historical Provider, MD   docusate sodium (COLACE) 100 MG capsule Take 100 mg by mouth 2 times daily as needed for Constipation   Yes Historical Provider, MD   aluminum & magnesium hydroxide-simethicone (MAALOX) 044-653-71 MG/5ML SUSP suspension Take 5 mLs by mouth every 6 hours as needed for Indigestion   Yes Historical Provider, MD   vitamin B-12 (CYANOCOBALAMIN) 1000 MCG tablet Take 1,000 mcg by mouth daily   Yes Historical Provider, MD   Melatonin 10 MG TABS Take 10 mg by mouth nightly as needed (sleep)   Yes Historical Provider, MD   ondansetron (ZOFRAN-ODT) 4 MG disintegrating tablet Take 4 mg by mouth every 8 hours as needed for Nausea or Vomiting   Yes Historical Provider, MD   megestrol (MEGACE) 40 MG tablet Take 80 mg by mouth 2 times daily Indications: alternates with tamoxifen 20mg BID every 3 weeks, next megestrol dose due 10/9/22   Yes Historical Provider, MD   divalproex (DEPAKOTE) 500 MG DR tablet Take 500 mg by mouth 2 times daily Indications: started on 22, patient currently transitioning off [address] - last dose will be 10/5/22   Yes Historical Provider, MD        Allergies:       Ciprofloxacin, Pcn [penicillins], and Sulfa antibiotics    Social History:     Tobacco:    reports that she has never smoked. She has never used smokeless tobacco.  Alcohol:      reports no history of alcohol use. Drug Use:  reports no history of drug use. Family History:     No family history on file.       Physical Exam:     Vitals:  /63   Pulse 87   Temp 97.8 °F (36.6 °C) (Oral)   Resp 16   Ht 5' 2\" (1.575 m)   Wt 193 lb 3.2 oz (87.6 kg)   SpO2 94%   BMI 35.34 kg/m²   Temp (24hrs), Av.1 °F (36.7 °C), Min:97.8 °F (36.6 °C), Max:98.3 °F (36.8 °C)          General appearance - alert, well appearing, and in no acute distress  Mental status -confused  Head - normocephalic and atraumatic  Eyes - pupils equal and reactive, extraocular eye movements intact, conjunctiva clear  Ears - hearing appears to be intact  Nose - no drainage noted  Mouth - mucous membranes moist  Neck - supple, no carotid bruits, thyroid not palpable  Chest - clear to auscultation, normal effort  Heart - normal rate, regular rhythm, no murmur  Abdomen - soft, nontender, nondistended, bowel sounds present all four quadrants, no masses, hepatomegaly or splenomegaly  Neurological - normal speech, no focal findings or movement disorder noted,. Extremities - peripheral pulses palpable, no pedal edema or calf pain with palpation  Skin - no gross lesions, rashes, or induration noted        Data:     Labs:    Hematology:  Recent Labs     09/19/22 1231 09/20/22  0733 09/21/22  0617   WBC 9.1 5.9 5.3   RBC 3.48* 3.22* 3.06*   HGB 11.1* 10.1* 9.6*   HCT 33.9* 31.6* 30.0*   MCV 97.4 98.1 98.0   MCH 31.9 31.4 31.4   MCHC 32.7 32.0 32.0   RDW 16.2* 16.3* 16.5*    174 161   MPV 9.9 9.8 9.5       Chemistry:  Recent Labs     09/19/22  1231 09/19/22 1806 09/19/22 2116 09/20/22 0733 09/20/22 1606 09/21/22  0001 09/21/22  0617     --   --  140  --   --  139   K 3.8  --   --  3.8  --   --  3.9     --   --  108*  --   --  108*   CO2 25  --   --  27  --   --  23   GLUCOSE 70  --   --  96  --   --  101*   BUN 12  --   --  7*  --   --  6*   CREATININE 0.57  --   --  0.49*  --   --  0.42*   ANIONGAP 11  --   --  5*  --   --  8*   LABGLOM >60  --   --  >60  --   --  >60   GFRAA >60  --   --  >60  --   --  >60   CALCIUM 9.1  --   --  8.1*  --   --  8.1*   PROBNP  --  129  --  512*  --   --  238   TROPHS  --   --    < > 19* 25* 16* 16*    < > = values in this interval not displayed.        Recent Labs     09/19/22 1806 09/19/22 2116 09/20/22 1606 09/20/22 2134 09/21/22  0617 09/21/22  1115   PROT  --   --  5.0*  --  4.8*  --    LABALBU  --   --  3.2*  --  3.0*  --    AST  --   --  13  --  13  --    ALT  --   --  12  --  10  --    ALKPHOS  --   --  41  --  39  --    BILITOT  --   --  0.2*  --  0.1*  --    BILIDIR  --   --  <0.1  --  <0.1  --    AMMONIA 51 55*  --   --   --   --    POCGLU  --   --   --  142* 99 171*         No results found for: INR, PROTIME    Lab Results   Component Value Date/Time    SPECIAL LFA,1ML 09/19/2022 02:12 PM     Lab Results   Component Value Date/Time    CULTURE NO GROWTH 1 DAY 09/19/2022 02:12 PM       No results found for: POCPH, PHART, PH, POCPCO2, YJY9PFH, PCO2, POCPO2, PO2ART, PO2, POCHCO3, TKB5IHC, HCO3, NBEA, PBEA, BEART, BE, THGBART, THB, FCN9FMG, FYSO9YYU, X4RWENDM, O2SAT, FIO2    Radiology:    CT HEAD WO CONTRAST    Result Date: 9/19/2022  Mild central and cortical cerebral atrophy. Mild chronic deep white matter ischemic changes Old lacunar infarct in the right basal ganglia. No acute intracranial abnormalities are noted. XR CHEST PORTABLE    Result Date: 9/19/2022  Limited exam. Cardiomegaly with vascular crowding and or mild pulmonary vascular congestion. Bibasilar atelectasis or infiltrates crowding left with possible small left effusion. All radiological studies reviewed                Code Status:  Full Code    Electronically signed by Franco Daugherty MD on 9/21/2022 at 1:23 PM     Copy sent to Dr. Gregory Eastman MD    This note was created with the assistance of a speech-recognition program.  Although the intention is to generate a document that actually reflects the content of the visit, no guarantees can be provided that every mistake has been identified and corrected by editing. Note was updated later by me after  physical examination and  completion of the assessment.

## 2022-09-21 NOTE — PLAN OF CARE
Problem: Pain  Goal: Verbalizes/displays adequate comfort level or baseline comfort level  9/21/2022 0202 by Kendall Harris RN  Outcome: Progressing  Flowsheets (Taken 9/21/2022 0202)  Verbalizes/displays adequate comfort level or baseline comfort level:   Encourage patient to monitor pain and request assistance   Assess pain using appropriate pain scale   Administer analgesics based on type and severity of pain and evaluate response   Implement non-pharmacological measures as appropriate and evaluate response   Consider cultural and social influences on pain and pain management     Problem: Safety - Adult  Goal: Free from fall injury  Outcome: Progressing  Flowsheets (Taken 9/21/2022 0202)  Free From Fall Injury: Instruct family/caregiver on patient safety     Problem: Neurosensory - Adult  Goal: Achieves maximal functionality and self care  Outcome: Progressing  Flowsheets (Taken 9/21/2022 0202)  Achieves maximal functionality and self care:   Monitor swallowing and airway patency with patient fatigue and changes in neurological status   Encourage and assist patient to increase activity and self care with guidance from physical therapy/occupational therapy   Encourage visually impaired, hearing impaired and aphasic patients to use assistive/communication devices     Problem: Skin/Tissue Integrity - Adult  Goal: Skin integrity remains intact  Outcome: Progressing  Flowsheets (Taken 9/21/2022 0202)  Skin Integrity Remains Intact: Monitor for areas of redness and/or skin breakdown     Problem: Infection - Adult  Goal: Absence of infection during hospitalization  Outcome: Progressing  Flowsheets (Taken 9/21/2022 0202)  Absence of infection during hospitalization:   Assess and monitor for signs and symptoms of infection   Monitor lab/diagnostic results   Administer medications as ordered   Instruct and encourage patient and family to use good hand hygiene technique     Problem: Confusion  Goal: Confusion, delirium, dementia, or psychosis is improved or at baseline  Description: INTERVENTIONS:  1. Assess for possible contributors to thought disturbance, including medications, impaired vision or hearing, underlying metabolic abnormalities, dehydration, psychiatric diagnoses, and notify attending LIP  2. [place] high risk fall precautions, as indicated  3. Provide frequent short contacts to provide reality reorientation, refocusing and direction  4. Decrease environmental stimuli, including noise as appropriate  5. Monitor and intervene to maintain adequate nutrition, hydration, elimination, sleep and activity  6. If unable to ensure safety without constant attention obtain sitter and review sitter guidelines with assigned personnel  7.  Initiate Psychosocial CNS and Spiritual Care consult, as indicated  Outcome: Progressing  Flowsheets (Taken 9/21/2022 0202)  Effect of thought disturbance (confusion, delirium, dementia, or psychosis) are managed with adequate functional status:   Assess for contributors to thought disturbance, including medications, impaired vision or hearing, underlying metabolic abnormalities, dehydration, psychiatric diagnoses, notify [facility] high risk fall precautions, as indicated   Provide frequent short contacts to provide reality reorientation, refocusing and direction   Decrease environmental stimuli, including noise as appropriate   Monitor and intervene to maintain adequate nutrition, hydration, elimination, sleep and activity   If unable to ensure safety without constant attention obtain sitter and review sitter guidelines with assigned personnel     Problem: Chronic Conditions and Co-morbidities  Goal: Patient's chronic conditions and co-morbidity symptoms are monitored and maintained or improved  Outcome: Progressing  Flowsheets (Taken 9/21/2022 0202)  Care Plan - Patient's Chronic Conditions and Co-Morbidity Symptoms are Monitored and Maintained or Improved:   Monitor and assess patient's chronic conditions and comorbid symptoms for stability, deterioration, or improvement   Collaborate with multidisciplinary team to address chronic and comorbid conditions and prevent exacerbation or deterioration   Update acute care plan with appropriate goals if chronic or comorbid symptoms are exacerbated and prevent overall improvement and discharge

## 2022-09-22 VITALS
TEMPERATURE: 98 F | HEART RATE: 93 BPM | DIASTOLIC BLOOD PRESSURE: 65 MMHG | SYSTOLIC BLOOD PRESSURE: 133 MMHG | BODY MASS INDEX: 36.46 KG/M2 | WEIGHT: 198.1 LBS | RESPIRATION RATE: 16 BRPM | HEIGHT: 62 IN | OXYGEN SATURATION: 92 %

## 2022-09-22 LAB
ABSOLUTE EOS #: 0.22 K/UL (ref 0–0.44)
ABSOLUTE IMMATURE GRANULOCYTE: 0.03 K/UL (ref 0–0.3)
ABSOLUTE LYMPH #: 1.72 K/UL (ref 1.1–3.7)
ABSOLUTE MONO #: 0.51 K/UL (ref 0.1–1.2)
ALBUMIN SERPL-MCNC: 3 G/DL (ref 3.5–5.2)
ALP BLD-CCNC: 39 U/L (ref 35–104)
ALT SERPL-CCNC: 12 U/L (ref 5–33)
AMMONIA: 41 UMOL/L (ref 11–51)
ANION GAP SERPL CALCULATED.3IONS-SCNC: 10 MMOL/L (ref 9–17)
AST SERPL-CCNC: 13 U/L
BASOPHILS # BLD: 0 % (ref 0–2)
BASOPHILS ABSOLUTE: <0.03 K/UL (ref 0–0.2)
BILIRUB SERPL-MCNC: 0.1 MG/DL (ref 0.3–1.2)
BILIRUBIN DIRECT: <0.1 MG/DL
BILIRUBIN, INDIRECT: ABNORMAL MG/DL (ref 0–1)
BUN BLDV-MCNC: 5 MG/DL (ref 8–23)
BUN/CREAT BLD: 13 (ref 9–20)
CALCIUM SERPL-MCNC: 8.4 MG/DL (ref 8.6–10.4)
CHLORIDE BLD-SCNC: 106 MMOL/L (ref 98–107)
CO2: 22 MMOL/L (ref 20–31)
CREAT SERPL-MCNC: 0.4 MG/DL (ref 0.5–0.9)
EOSINOPHILS RELATIVE PERCENT: 4 % (ref 1–4)
GFR AFRICAN AMERICAN: >60 ML/MIN
GFR NON-AFRICAN AMERICAN: >60 ML/MIN
GFR SERPL CREATININE-BSD FRML MDRD: ABNORMAL ML/MIN/{1.73_M2}
GLUCOSE BLD-MCNC: 111 MG/DL (ref 65–105)
GLUCOSE BLD-MCNC: 172 MG/DL (ref 65–105)
GLUCOSE BLD-MCNC: 185 MG/DL (ref 65–105)
GLUCOSE BLD-MCNC: 43 MG/DL (ref 65–105)
GLUCOSE BLD-MCNC: 55 MG/DL (ref 70–99)
HCT VFR BLD CALC: 30.9 % (ref 36.3–47.1)
HEMOGLOBIN: 9.8 G/DL (ref 11.9–15.1)
IMMATURE GRANULOCYTES: 1 %
LYMPHOCYTES # BLD: 27 % (ref 24–43)
MCH RBC QN AUTO: 31.5 PG (ref 25.2–33.5)
MCHC RBC AUTO-ENTMCNC: 31.7 G/DL (ref 28.4–34.8)
MCV RBC AUTO: 99.4 FL (ref 82.6–102.9)
MONOCYTES # BLD: 8 % (ref 3–12)
NRBC AUTOMATED: 0 PER 100 WBC
PDW BLD-RTO: 16.5 % (ref 11.8–14.4)
PLATELET # BLD: 165 K/UL (ref 138–453)
PMV BLD AUTO: 9.8 FL (ref 8.1–13.5)
POTASSIUM SERPL-SCNC: 4 MMOL/L (ref 3.7–5.3)
PRO-BNP: 302 PG/ML
RBC # BLD: 3.11 M/UL (ref 3.95–5.11)
RBC # BLD: ABNORMAL 10*6/UL
SEG NEUTROPHILS: 60 % (ref 36–65)
SEGMENTED NEUTROPHILS ABSOLUTE COUNT: 3.82 K/UL (ref 1.5–8.1)
SODIUM BLD-SCNC: 138 MMOL/L (ref 135–144)
TOTAL PROTEIN: 4.8 G/DL (ref 6.4–8.3)
TROPONIN, HIGH SENSITIVITY: 10 NG/L (ref 0–14)
TROPONIN, HIGH SENSITIVITY: 12 NG/L (ref 0–14)
TROPONIN, HIGH SENSITIVITY: 12 NG/L (ref 0–14)
WBC # BLD: 6.3 K/UL (ref 3.5–11.3)

## 2022-09-22 PROCEDURE — 6370000000 HC RX 637 (ALT 250 FOR IP): Performed by: HOSPITALIST

## 2022-09-22 PROCEDURE — 36415 COLL VENOUS BLD VENIPUNCTURE: CPT

## 2022-09-22 PROCEDURE — 82140 ASSAY OF AMMONIA: CPT

## 2022-09-22 PROCEDURE — 83880 ASSAY OF NATRIURETIC PEPTIDE: CPT

## 2022-09-22 PROCEDURE — 84484 ASSAY OF TROPONIN QUANT: CPT

## 2022-09-22 PROCEDURE — 85025 COMPLETE CBC W/AUTO DIFF WBC: CPT

## 2022-09-22 PROCEDURE — 82947 ASSAY GLUCOSE BLOOD QUANT: CPT

## 2022-09-22 PROCEDURE — 80048 BASIC METABOLIC PNL TOTAL CA: CPT

## 2022-09-22 PROCEDURE — 80076 HEPATIC FUNCTION PANEL: CPT

## 2022-09-22 PROCEDURE — APPSS30 APP SPLIT SHARED TIME 16-30 MINUTES: Performed by: NURSE PRACTITIONER

## 2022-09-22 PROCEDURE — 99232 SBSQ HOSP IP/OBS MODERATE 35: CPT | Performed by: INTERNAL MEDICINE

## 2022-09-22 PROCEDURE — 2580000003 HC RX 258: Performed by: HOSPITALIST

## 2022-09-22 PROCEDURE — 6360000002 HC RX W HCPCS: Performed by: HOSPITALIST

## 2022-09-22 RX ORDER — LEVETIRACETAM 500 MG/1
500 TABLET ORAL 2 TIMES DAILY
Qty: 60 TABLET | Refills: 0 | Status: SHIPPED | OUTPATIENT
Start: 2022-09-22 | End: 2022-09-28

## 2022-09-22 RX ORDER — LACTULOSE 10 G/15ML
20 SOLUTION ORAL DAILY PRN
Qty: 100 ML | Refills: 0 | Status: SHIPPED | OUTPATIENT
Start: 2022-09-22 | End: 2022-10-07

## 2022-09-22 RX ORDER — CEFUROXIME AXETIL 500 MG/1
500 TABLET ORAL 2 TIMES DAILY
Qty: 10 TABLET | Refills: 0 | Status: SHIPPED | OUTPATIENT
Start: 2022-09-22 | End: 2022-09-27

## 2022-09-22 RX ORDER — LEVETIRACETAM 500 MG/1
500 TABLET ORAL DAILY
Qty: 60 TABLET | Refills: 0 | Status: SHIPPED | OUTPATIENT
Start: 2022-09-28 | End: 2022-10-04

## 2022-09-22 RX ADMIN — CARVEDILOL 25 MG: 25 TABLET, FILM COATED ORAL at 11:55

## 2022-09-22 RX ADMIN — TAMOXIFEN CITRATE 20 MG: 10 TABLET ORAL at 11:58

## 2022-09-22 RX ADMIN — PANTOPRAZOLE SODIUM 40 MG: 40 TABLET, DELAYED RELEASE ORAL at 12:59

## 2022-09-22 RX ADMIN — ENOXAPARIN SODIUM 40 MG: 100 INJECTION SUBCUTANEOUS at 11:57

## 2022-09-22 RX ADMIN — POTASSIUM CHLORIDE 20 MEQ: 1500 TABLET, EXTENDED RELEASE ORAL at 11:57

## 2022-09-22 RX ADMIN — DIVALPROEX SODIUM 500 MG: 500 TABLET, EXTENDED RELEASE ORAL at 11:54

## 2022-09-22 RX ADMIN — CYANOCOBALAMIN TAB 1000 MCG 1000 MCG: 1000 TAB at 11:53

## 2022-09-22 RX ADMIN — Medication 16 G: at 06:30

## 2022-09-22 RX ADMIN — MULTIPLE VITAMINS W/ MINERALS TAB 1 TABLET: TAB at 11:54

## 2022-09-22 RX ADMIN — DULOXETINE HYDROCHLORIDE 30 MG: 30 CAPSULE, DELAYED RELEASE ORAL at 11:54

## 2022-09-22 RX ADMIN — LISINOPRIL 10 MG: 10 TABLET ORAL at 11:55

## 2022-09-22 RX ADMIN — LEVETIRACETAM 500 MG: 500 TABLET, FILM COATED ORAL at 11:53

## 2022-09-22 RX ADMIN — SODIUM CHLORIDE, PRESERVATIVE FREE 10 ML: 5 INJECTION INTRAVENOUS at 11:55

## 2022-09-22 RX ADMIN — ACETAMINOPHEN 650 MG: 325 TABLET, FILM COATED ORAL at 18:57

## 2022-09-22 RX ADMIN — Medication 400 MG: at 12:58

## 2022-09-22 NOTE — PROGRESS NOTES
DATE: 2022    NAME: Saira Roper  MRN: [id number]   : 1950    Patient not seen this date for Occupational Therapy due to:      [] Cancel by RN or physician due to:    [] Hemodialysis    [] Critical Lab Value Level     [] Blood transfusion in progress    [] Acute or unstable cardiovascular status   _MAP < 55 or more than >115  _HR < 40 or > 130    [] Acute or unstable pulmonary status   -FiO2 > 60%   _RR < 5 or >40    _O2 sats < 85%    [] Strict Bedrest    [] Off Unit for surgery or procedure    [] Off Unit for testing       [] Pending imaging to R/O fracture    [x] Refusal by Patient : Pt. Voiced \"Please not today. ... yes I am refusing\". Pt. Explained reasons for therapy and  present to provide encouragement. OT will cont to follow. [] Other      [] OT being discontinued at this time. Patient independent. No further needs. [] OT being discontinued at this time as the patient has been transferred to hospice care. No further needs.       VENECIA Figueredo

## 2022-09-22 NOTE — PROGRESS NOTES
Physical Therapy  DATE: 2022    NAME: Esteban Russo  MRN: [id number]   : 1950    Patient not seen this date for Physical Therapy due to:      [] Cancel by RN or physician due to:    [] Hemodialysis    [] Critical Lab Value Level     [] Blood transfusion in progress    [] Acute or unstable cardiovascular status   _MAP < 55 or more than >115  _HR < 40 or > 130    [] Acute or unstable pulmonary status   -FiO2 > 60%   _RR < 5 or >40    _O2 sats < 85%    [] Strict Bedrest    [] Off Unit for surgery or procedure    [] Off Unit for testing       [] Pending imaging to R/O fracture    [x] Refusal by Patient Pt. Voiced \"Please not today. ... yes I am refusing\". Pt. Explained reasons for therapy and  present to provide encouragement. PT will cont to follow. [] Other      [] PT being discontinued at this time. Patient independent. No further needs. [] PT being discontinued at this time as the patient has been transferred to hospice care. No further needs.       LUCERO HUGGINS, PTA

## 2022-09-22 NOTE — PROGRESS NOTES
Daily    CoQ10  100 mg Oral Daily    vitamin B-12  1,000 mcg Oral Daily    divalproex  500 mg Oral BID    levETIRAcetam  500 mg Oral BID    Followed by    Arabella Regalado ON 9/28/2022] levETIRAcetam  500 mg Oral Daily    sodium chloride flush  10 mL IntraVENous 2 times per day    enoxaparin  40 mg SubCUTAneous Daily     Continuous Infusions:   dextrose      sodium chloride       PRN Meds:  lactulose, 20 g, Daily PRN  docusate sodium, 100 mg, BID PRN  melatonin, 9 mg, Nightly PRN  glucose, 4 tablet, PRN  dextrose bolus, 125 mL, PRN   Or  dextrose bolus, 250 mL, PRN  glucagon (rDNA), 1 mg, PRN  dextrose, , Continuous PRN  sodium chloride flush, 10 mL, PRN  sodium chloride, , PRN  potassium chloride, 40 mEq, PRN   Or  potassium alternative oral replacement, 40 mEq, PRN   Or  potassium chloride, 10 mEq, PRN  magnesium sulfate, 1,000 mg, PRN  ondansetron, 4 mg, Q8H PRN   Or  ondansetron, 4 mg, Q6H PRN  senna, 1 tablet, BID PRN  acetaminophen, 650 mg, Q6H PRN   Or  acetaminophen, 650 mg, Q6H PRN    Chief Complaint:     Chief Complaint   Patient presents with    Altered Mental Status         History of Present Illness:      Patient seen examined at bedside. Patient was sleepy. No significant change in her mental status. Patient has underlying dementia. She is more calm. Afebrile  No acute events reported    HPI:        Meenakshi Johns is a 71 y.o.  female who presents with Altered Mental Status    46-year-old lady was brought to the ER by her . There was concern the patient was not acting right. Per report, patient abruptly sat down and began to shake. Initially has been was concerned if she has had seizure. Patient has been intermittently confused and disoriented. Also complaining of very weak.  is concerned that usually when she gets infection she  Back. Patient has Alzheimer's dementia. She denies any chest pain, cough, cold, changes in urination, bowel habit or rash.   On presentation hemoglobin was 11.1. UA was significant for trace leukocyte Estrace. X-ray chest shows cardiomegaly with vascular crowding and mild pulmonary vascular congestion. Bibasilar atelectasis or infiltrate crowding left with possible small left effusion. CT brain did not show any acute changes. I have personally reviewed the past medical history, past surgical history, medications, social history, and family history, and summarized in the note. Review of Systems:     All 10 point system is reviewed and negative otherwise mentioned in HPI. Past Medical History:     Past Medical History:   Diagnosis Date    Anxiety     Arteriovenous malformation of liver     Arthritis     Dementia ([facility] [address]     Depression     GERD (gastroesophageal reflux disease)     HTN (hypertension)     Hyperlipidemia     Hyperlipidemia     Insomnia     Malignant neoplasm metastatic to bone (HCC)     Malignant neoplasm of endocervix (HCC)     OP (osteoporosis)     Pneumonia     Seizure (HCC)     Type 2 diabetes mellitus ([facility] [address]     UTI (urinary tract infection)         Past Surgical History:     Past Surgical History:   Procedure Laterality Date    BACK SURGERY      BRONCHOSCOPY      COLONOSCOPY      ESOPHAGOGASTRODUODENOSCOPY      ROBOTIC ASSISTED HYSTERECTOMY      BSO & LYMPH NODE DISSECTION        Medications Prior to Admission:       Prior to Admission medications    Medication Sig Start Date End Date Taking?  Authorizing Provider   carvedilol (COREG) 25 MG tablet Take 25 mg by mouth 2 times daily (with meals)   Yes Historical Provider, MD   DULoxetine (CYMBALTA) 30 MG extended release capsule Take 30 mg by mouth daily   Yes Historical Provider, MD   glimepiride (AMARYL) 1 MG tablet Take 1 mg by mouth 2 times daily (with meals)   Yes Historical Provider, MD   lisinopril (PRINIVIL;ZESTRIL) 10 MG tablet Take 10 mg by mouth daily   Yes Historical Provider, MD   pantoprazole (PROTONIX) 40 MG tablet Take 40 mg by mouth daily   Yes Historical Provider, MD potassium chloride (KLOR-CON M) 20 MEQ extended release tablet Take 20 mEq by mouth daily   Yes Historical Provider, MD   simvastatin (ZOCOR) 20 MG tablet Take 20 mg by mouth nightly   Yes Historical Provider, MD   tamoxifen (NOLVADEX) 20 MG tablet Take 20 mg by mouth 2 times daily Indications: alternates with megestrol 80mg BID every 3 weeks, just started tamoxifen on 9/18/22   Yes Historical Provider, MD   levETIRAcetam (KEPPRA) 1000 MG tablet Take 500 mg by mouth every morning Indications: transitioning off keppra - 500mg BID from 9/21-9/27 then 500mg daily from 9/28-10/4 then off   Yes Historical Provider, MD   levETIRAcetam (KEPPRA) 1000 MG tablet Take 1,000 mg by mouth every evening Indications: transitioning off keppra - 500mg BID from 9/21-9/27 then 500mg daily from 9/28-10/4 then off   Yes Historical Provider, MD   metFORMIN (GLUCOPHAGE-XR) 500 MG extended release tablet Take 1,000 mg by mouth in the morning and at bedtime   Yes Historical Provider, MD   traZODone (DESYREL) 100 MG tablet Take 100 mg by mouth nightly   Yes Historical Provider, MD   Probiotic Product (PROBIOTIC ADVANCED PO) Take 1 tablet by mouth daily   Yes Historical Provider, MD   GINKGO BILOBA EXTRACT PO Take 1 tablet by mouth daily   Yes Historical Provider, MD   Magnesium 500 MG TABS Take 500 mg by mouth 2 times daily   Yes Historical Provider, MD   Multiple Vitamins-Minerals (THERAPEUTIC MULTIVITAMIN-MINERALS) tablet Take 1 tablet by mouth daily   Yes Historical Provider, MD   acetaminophen (TYLENOL) 500 MG tablet Take 500 mg by mouth every 6 hours as needed for Pain   Yes Historical Provider, MD   Coenzyme Q10 (COQ10) 100 MG CAPS Take 100 mg by mouth daily   Yes Historical Provider, MD   APPLE CIDER VINEGAR PO Take 1 tablet by mouth daily   Yes Historical Provider, MD   docusate sodium (COLACE) 100 MG capsule Take 100 mg by mouth 2 times daily as needed for Constipation   Yes Historical Provider, MD   aluminum & magnesium hydroxide-simethicone (MAALOX) 325-622-43 MG/5ML SUSP suspension Take 5 mLs by mouth every 6 hours as needed for Indigestion   Yes Historical Provider, MD   vitamin B-12 (CYANOCOBALAMIN) 1000 MCG tablet Take 1,000 mcg by mouth daily   Yes Historical Provider, MD   Melatonin 10 MG TABS Take 10 mg by mouth nightly as needed (sleep)   Yes Historical Provider, MD   ondansetron (ZOFRAN-ODT) 4 MG disintegrating tablet Take 4 mg by mouth every 8 hours as needed for Nausea or Vomiting   Yes Historical Provider, MD   megestrol (MEGACE) 40 MG tablet Take 80 mg by mouth 2 times daily Indications: alternates with tamoxifen 20mg BID every 3 weeks, next megestrol dose due 10/9/22   Yes Historical Provider, MD   divalproex (DEPAKOTE) 500 MG DR tablet Take 500 mg by mouth 2 times daily Indications: started on 22, patient currently transitioning off Elizabeth Current - last dose will be 10/5/22   Yes Historical Provider, MD        Allergies:       Ciprofloxacin, Pcn [penicillins], and Sulfa antibiotics    Social History:     Tobacco:    reports that she has never smoked. She has never used smokeless tobacco.  Alcohol:      reports no history of alcohol use. Drug Use:  reports no history of drug use. Family History:     No family history on file.       Physical Exam:     Vitals:  /68   Pulse (!) 103   Temp 98.1 °F (36.7 °C) (Oral)   Resp 16   Ht 5' 2\" (1.575 m)   Wt 198 lb 1.6 oz (89.9 kg)   SpO2 92%   BMI 36.23 kg/m²   Temp (24hrs), Av.9 °F (36.6 °C), Min:97.5 °F (36.4 °C), Max:98.1 °F (36.7 °C)          General appearance - alert, well appearing, and in no acute distress  Mental status -confused  Head - normocephalic and atraumatic  Eyes - pupils equal and reactive, extraocular eye movements intact, conjunctiva clear  Ears - hearing appears to be intact  Nose - no drainage noted  Mouth - mucous membranes moist  Neck - supple, no carotid bruits, thyroid not palpable  Chest - clear to auscultation, normal effort  Heart - normal rate, regular rhythm, no murmur  Abdomen - soft, nontender, nondistended, bowel sounds present all four quadrants, no masses, hepatomegaly or splenomegaly  Neurological - normal speech, no focal findings or movement disorder noted,. Extremities - peripheral pulses palpable, no pedal edema or calf pain with palpation  Skin - no gross lesions, rashes, or induration noted        Data:     Labs:    Hematology:  Recent Labs     09/20/22  0733 09/21/22  0617 09/22/22  0653   WBC 5.9 5.3 6.3   RBC 3.22* 3.06* 3.11*   HGB 10.1* 9.6* 9.8*   HCT 31.6* 30.0* 30.9*   MCV 98.1 98.0 99.4   MCH 31.4 31.4 31.5   MCHC 32.0 32.0 31.7   RDW 16.3* 16.5* 16.5*    161 165   MPV 9.8 9.5 9.8       Chemistry:  Recent Labs     09/20/22  0733 09/20/22  1606 09/21/22  0617 09/21/22  1549 09/21/22  2338 09/22/22  0653     --  139  --   --  138   K 3.8  --  3.9  --   --  4.0   *  --  108*  --   --  106   CO2 27  --  23  --   --  22   GLUCOSE 96  --  101*  --   --  55*   BUN 7*  --  6*  --   --  5*   CREATININE 0.49*  --  0.42*  --   --  0.40*   ANIONGAP 5*  --  8*  --   --  10   LABGLOM >60  --  >60  --   --  >60   GFRAA >60  --  >60  --   --  >60   CALCIUM 8.1*  --  8.1*  --   --  8.4*   PROBNP 512*  --  238  --   --  302*   TROPHS 19*   < > 16* 17* 12 12    < > = values in this interval not displayed.        Recent Labs     09/19/22  2116 09/20/22  1606 09/20/22  2134 09/21/22  0617 09/21/22  1115 09/21/22  1549 09/21/22  1556 09/21/22 2047 09/22/22  0630 09/22/22  0653 09/22/22  0715 09/22/22  1111   PROT  --  5.0*  --  4.8*  --   --   --   --   --  4.8*  --   --    LABALBU  --  3.2*  --  3.0*  --   --   --   --   --  3.0*  --   --    AST  --  13  --  13  --   --   --   --   --  13  --   --    ALT  --  12  --  10  --   --   --   --   --  12  --   --    ALKPHOS  --  41  --  39  --   --   --   --   --  39  --   --    BILITOT  --  0.2*  --  0.1*  --   --   --   --   --  0.1*  --   --    BILIDIR  --  <0.1  --  <0.1  -- --   --   --   --  <0.1  --   --    AMMONIA 55*  --   --   --   --  39  --   --   --  41  --   --    POCGLU  --   --    < > 99 171*  --  162* 139* 43*  --  111* 185*    < > = values in this interval not displayed. No results found for: INR, PROTIME    Lab Results   Component Value Date/Time    SPECIAL LFA,1ML 09/19/2022 02:12 PM     Lab Results   Component Value Date/Time    CULTURE NO GROWTH 2 DAYS 09/19/2022 02:12 PM       No results found for: POCPH, PHART, PH, POCPCO2, ROY1FGW, PCO2, POCPO2, PO2ART, PO2, POCHCO3, JJM6QLN, HCO3, NBEA, PBEA, BEART, BE, THGBART, THB, TTP4ANI, WWZG9IOX, F0WYZEBD, O2SAT, FIO2    Radiology:    CT HEAD WO CONTRAST    Result Date: 9/19/2022  Mild central and cortical cerebral atrophy. Mild chronic deep white matter ischemic changes Old lacunar infarct in the right basal ganglia. No acute intracranial abnormalities are noted. XR CHEST PORTABLE    Result Date: 9/19/2022  Limited exam. Cardiomegaly with vascular crowding and or mild pulmonary vascular congestion. Bibasilar atelectasis or infiltrates crowding left with possible small left effusion. All radiological studies reviewed                Code Status:  Full Code    Electronically signed by Alberta Pérez MD on 9/22/2022 at 1:51 PM     Copy sent to Dr. Franky Alarcon MD    This note was created with the assistance of a speech-recognition program.  Although the intention is to generate a document that actually reflects the content of the visit, no guarantees can be provided that every mistake has been identified and corrected by editing. Note was updated later by me after  physical examination and  completion of the assessment.

## 2022-09-22 NOTE — PLAN OF CARE
Problem: Discharge Planning  Goal: Discharge to home or other facility with appropriate resources  9/22/2022 1729 by Rian Milligan RN  Outcome: Progressing  9/22/2022 0439 by Jhonny Bass RN  Outcome: Progressing  Flowsheets (Taken 9/22/2022 2572)  Discharge to home or other facility with appropriate resources:   Identify barriers to discharge with patient and caregiver   Refer to discharge planning if patient needs post-hospital services based on physician order or complex needs related to functional status, cognitive ability or social support system     Problem: Pain  Goal: Verbalizes/displays adequate comfort level or baseline comfort level  9/22/2022 1729 by Rian Milligan RN  Outcome: Progressing  9/22/2022 0439 by Jhonny Bass RN  Outcome: Progressing  Flowsheets (Taken 9/22/2022 9625)  Verbalizes/displays adequate comfort level or baseline comfort level:   Encourage patient to monitor pain and request assistance   Assess pain using appropriate pain scale   Consider cultural and social influences on pain and pain management     Problem: Safety - Adult  Goal: Free from fall injury  9/22/2022 1729 by Rian Milligan RN  Outcome: Progressing  9/22/2022 0439 by Jhonny Bass RN  Outcome: Progressing  Flowsheets (Taken 9/22/2022 0439)  Free From Fall Injury: Instruct family/caregiver on patient safety     Problem: Neurosensory - Adult  Goal: Absence of seizures  9/22/2022 1729 by Rian Milligan RN  Outcome: Progressing  9/22/2022 0439 by Jhonny Bass RN  Outcome: Progressing  Flowsheets (Taken 9/22/2022 0439)  Absence of seizures:   Monitor for seizure activity.   If seizure occurs, document type and location of movements and any associated apnea   Administer anticonvulsants as ordered   Diagnostic studies as ordered  Goal: Remains free of injury related to seizures activity  Outcome: Progressing  Goal: Achieves maximal functionality and self care  9/22/2022 1729 by Rian Milligan RN  Outcome: Progressing  9/22/2022 0439 by Fatmata Monk RN  Outcome: Progressing  Flowsheets (Taken 9/22/2022 0439)  Achieves maximal functionality and self care:   Monitor swallowing and airway patency with patient fatigue and changes in neurological status   Encourage and assist patient to increase activity and self care with guidance from physical therapy/occupational therapy     Problem: Skin/Tissue Integrity - Adult  Goal: Skin integrity remains intact  9/22/2022 1729 by Augie Alexis RN  Outcome: Progressing  9/22/2022 0439 by Fatmata Monk RN  Outcome: Progressing  Flowsheets (Taken 9/22/2022 0439)  Skin Integrity Remains Intact: Monitor for areas of redness and/or skin breakdown  Goal: Oral mucous membranes remain intact  Outcome: Progressing     Problem: Infection - Adult  Goal: Absence of infection at discharge  Outcome: Progressing  Goal: Absence of infection during hospitalization  9/22/2022 1729 by Augie Alexis RN  Outcome: Progressing  9/22/2022 0439 by Fatmata Monk RN  Outcome: Progressing  Flowsheets (Taken 9/22/2022 0439)  Absence of infection during hospitalization:   Assess and monitor for signs and symptoms of infection   Monitor lab/diagnostic results   Administer medications as ordered   Instruct and encourage patient and family to use good hand hygiene technique  Goal: Absence of fever/infection during anticipated neutropenic period  Outcome: Progressing     Problem: Confusion  Goal: Confusion, delirium, dementia, or psychosis is improved or at baseline  Description: INTERVENTIONS:  1. Assess for possible contributors to thought disturbance, including medications, impaired vision or hearing, underlying metabolic abnormalities, dehydration, psychiatric diagnoses, and notify attending LIP  2. [place] high risk fall precautions, as indicated  3. Provide frequent short contacts to provide reality reorientation, refocusing and direction  4.  Decrease environmental stimuli, including noise as appropriate  5. Monitor and intervene to maintain adequate nutrition, hydration, elimination, sleep and activity  6. If unable to ensure safety without constant attention obtain sitter and review sitter guidelines with assigned personnel  7.  Initiate Psychosocial CNS and Spiritual Care consult, as indicated  9/22/2022 1729 by Vladimir Farrell RN  Outcome: Progressing  9/22/2022 0439 by Leonila Nichole RN  Outcome: Progressing  Flowsheets (Taken 9/22/2022 5471)  Effect of thought disturbance (confusion, delirium, dementia, or psychosis) are managed with adequate functional status:   Assess for contributors to thought disturbance, including medications, impaired vision or hearing, underlying metabolic abnormalities, dehydration, psychiatric diagnoses, notify [facility] high risk fall precautions, as indicated   Provide frequent short contacts to provide reality reorientation, refocusing and direction   Decrease environmental stimuli, including noise as appropriate   Monitor and intervene to maintain adequate nutrition, hydration, elimination, sleep and activity     Problem: Chronic Conditions and Co-morbidities  Goal: Patient's chronic conditions and co-morbidity symptoms are monitored and maintained or improved  9/22/2022 1729 by Vladimir Farrell RN  Outcome: Progressing  9/22/2022 0439 by Leonila Nichole RN  Outcome: Progressing  Flowsheets (Taken 9/22/2022 0439)  Care Plan - Patient's Chronic Conditions and Co-Morbidity Symptoms are Monitored and Maintained or Improved:   Monitor and assess patient's chronic conditions and comorbid symptoms for stability, deterioration, or improvement   Collaborate with multidisciplinary team to address chronic and comorbid conditions and prevent exacerbation or deterioration   Update acute care plan with appropriate goals if chronic or comorbid symptoms are exacerbated and prevent overall improvement and discharge     Problem: Skin/Tissue Integrity  Goal: Absence of new skin breakdown  Description: 1. Monitor for areas of redness and/or skin breakdown  2. Assess vascular access sites hourly  3. Every 4-6 hours minimum:  Change oxygen saturation probe site  4. Every 4-6 hours:  If on nasal continuous positive airway pressure, respiratory therapy assess nares and determine need for appliance change or resting period.   Outcome: Progressing     Problem: ABCDS Injury Assessment  Goal: Absence of physical injury  9/22/2022 1729 by Iván Garcia RN  Outcome: Progressing  9/22/2022 0439 by Rebekah Loo RN  Outcome: Progressing  Flowsheets (Taken 9/22/2022 0439)  Absence of Physical Injury: Implement safety measures based on patient assessment

## 2022-09-22 NOTE — CARE COORDINATION
Social work: anticipate [employer] today, discharge order pending. Patient to dc to [address] [place] [address] via [facility]  at [facility].  # for RN report: [phone], room 310. Completed and signed JULIÁN needs faxed to [phone](after 5) or [phone](IVUBAU 5pm). Informed RN, pt's son, and facility of dc time, agreeable to plan. HENS completed.

## 2022-09-22 NOTE — DISCHARGE SUMMARY
4 [facility]    Adult Hospitalist      Patient ID: Mya Ruano  MRN: [id number]     Acct:  [de-identified]       Patient's PCP: Karley Calderón MD    Admit Date: 9/19/2022     Discharge Date:   9/22/2022    Admitting Physician: Mars Hampton MD    Discharge Physician: Mars Hampton MD     CONSULTANTS: Patient Care Team:  Karley Calderón MD as PCP - General    PROCEDURES PERFORMED:     Active Discharge Diagnoses:  Concern for Seizure-ruled out  Hyperammonemia-improved  Acute metabolic encephalopathy-multifactorial  Bacteriuria  Left pleural effusion  Pulmonary vascular congestion/cardiomegaly  Bibasilar infiltrate/pneumonia-likely bacterial  Dementia      Primary Problem  AMS (altered mental status)    Hospital Course:   67-year-old lady was brought to the ER by her . There was concern the patient was not acting right. Per report, patient abruptly sat down and began to shake. Initially has been was concerned if she has had seizure. Patient has been intermittently confused and disoriented. Also complaining of very weak.  is concerned that usually when she gets infection she  Back. Patient has Alzheimer's dementia. She denies any chest pain, cough, cold, changes in urination, bowel habit or rash. On presentation hemoglobin was 11.1. UA was significant for trace leukocyte Estrace. X-ray chest shows cardiomegaly with vascular crowding and mild pulmonary vascular congestion. Bibasilar atelectasis or infiltrate crowding left with possible small left effusion. CT brain did not show any acute changes. Patient admitted for further management. Neurology evaluated the patient. Patient had an EEG which shows generalized encephalopathy. Patient has been undergoing Keppra tapering up titration of Depakote per her primary neurologist.  Neurology recommended to continue it as is,  and signed off. Patient also treated for pneumonia.   Received IV antibiotics. Discharged on P.o. Ceftin for pneumonia     Patient also noted to have elevated ammonia. Used p.o. lactulose p.r.n., monitor ammonia level. LFTs are normal.  Acute hepatitis panel was negative. GI evaluated the patient. Ultrasound liver did not show any acute pathology. The plan was discussed in detail with patient's  who agreed with the plan and verbalized understanding . The patient was seen and examined on day of discharge and this discharge summary is in conjunction with any daily progress note from day of discharge. Hospital Data:    Labs:    Hematology:  Recent Labs     09/20/22 0733 09/21/22  0617 09/22/22  0653   WBC 5.9 5.3 6.3   RBC 3.22* 3.06* 3.11*   HGB 10.1* 9.6* 9.8*   HCT 31.6* 30.0* 30.9*   MCV 98.1 98.0 99.4   MCH 31.4 31.4 31.5   MCHC 32.0 32.0 31.7   RDW 16.3* 16.5* 16.5*    161 165   MPV 9.8 9.5 9.8     Chemistry:  Recent Labs     09/20/22 0733 09/20/22  1606 09/21/22 0617 09/21/22  1549 09/21/22  2338 09/22/22  0653     --  139  --   --  138   K 3.8  --  3.9  --   --  4.0   *  --  108*  --   --  106   CO2 27  --  23  --   --  22   GLUCOSE 96  --  101*  --   --  55*   BUN 7*  --  6*  --   --  5*   CREATININE 0.49*  --  0.42*  --   --  0.40*   ANIONGAP 5*  --  8*  --   --  10   LABGLOM >60  --  >60  --   --  >60   GFRAA >60  --  >60  --   --  >60   CALCIUM 8.1*  --  8.1*  --   --  8.4*   PROBNP 512*  --  238  --   --  302*   TROPHS 19*   < > 16* 17* 12 12    < > = values in this interval not displayed.      Recent Labs     09/19/22 2116 09/20/22  1606 09/20/22  2134 09/21/22  0617 09/21/22  1115 09/21/22  1549 09/21/22  1556 09/21/22  2047 09/22/22  0630 09/22/22  0653 09/22/22  0715 09/22/22  1111   PROT  --  5.0*  --  4.8*  --   --   --   --   --  4.8*  --   --    LABALBU  --  3.2*  --  3.0*  --   --   --   --   --  3.0*  --   --    AST  --  13  --  13  --   --   --   --   --  13  --   --    ALT  --  12  --  10  --   --   --   --   --  12 --   --    ALKPHOS  --  41  --  39  --   --   --   --   --  39  --   --    BILITOT  --  0.2*  --  0.1*  --   --   --   --   --  0.1*  --   --    BILIDIR  --  <0.1  --  <0.1  --   --   --   --   --  <0.1  --   --    AMMONIA 55*  --   --   --   --  39  --   --   --  41  --   --    POCGLU  --   --    < > 99 171*  --  162* 139* 43*  --  111* 185*    < > = values in this interval not displayed. No results found for: INR, PROTIME  Lab Results   Component Value Date/Time    SPECIAL LFA,1ML 2022 02:12 PM     Lab Results   Component Value Date/Time    CULTURE NO GROWTH 2 DAYS 2022 02:12 PM       No results found for: POCPH, PHART, PH, POCPCO2, BMP2RZO, PCO2, POCPO2, PO2ART, PO2, POCHCO3, ZNM0SCK, HCO3, NBEA, PBEA, BEART, BE, THGBART, THB, ODH7QNK, EJUG8VDF, Q0GNRDCO, O2SAT, FIO2    Radiology:    XR CHEST (SINGLE VIEW FRONTAL)    Result Date: 2022  Mild left basal infiltrate suggesting pneumonia     CT HEAD WO CONTRAST    Result Date: 2022  Mild central and cortical cerebral atrophy. Mild chronic deep white matter ischemic changes Old lacunar infarct in the right basal ganglia. No acute intracranial abnormalities are noted. US LIVER    Result Date: 2022  Limited exam. Suspect hepatic steatosis. Recommend CT or MRI if additional imaging is warranted clinically. XR CHEST PORTABLE    Result Date: 2022  Limited exam. Cardiomegaly with vascular crowding and or mild pulmonary vascular congestion. Bibasilar atelectasis or infiltrates crowding left with possible small left effusion.          All radiological studies reviewed      Reviews of Symptoms:    A 10 point system is reviewed and  negative except described in hospital course    Physical Exam:    Vitals:  /66   Pulse 93   Temp 98.1 °F (36.7 °C) (Oral)   Resp 16   Ht 5' 2\" (1.575 m)   Wt 198 lb 1.6 oz (89.9 kg)   SpO2 96%   BMI 36.23 kg/m²   Temp (24hrs), Av °F (36.7 °C), Min:97.5 °F (36.4 °C), Max:98.1 °F (36.7 °C)      General appearance - alert, well appearing, and in no acute distress  Mental status - oriented to person, place, and time with normal affect  Head - normocephalic and atraumatic  Eyes - pupils equal and reactive, extraocular eye movements intact, conjunctiva clear  Ears - hearing appears to be intact  Nose - no drainage noted  Mouth - mucous membranes moist  Neck - supple, no carotid bruits, thyroid not palpable  Chest - clear to auscultation, normal effort  Heart - normal rate, regular rhythm, no murmur  Abdomen - soft, nontender, nondistended, bowel sounds present all four quadrants, no masses, hepatomegaly or splenomegaly  Neurological - normal speech, no focal findings or movement disorder noted, cranial nerves II through XII grossly intact  Extremities - peripheral pulses palpable, no pedal edema or calf pain with palpation  Skin - no gross lesions, rashes, or induration noted      Consults:  IP CONSULT TO HOSPITALIST  IP CONSULT TO NEUROLOGY  IP CONSULT TO SOCIAL WORK  IP CONSULT TO GI    Disposition: SNF    Discharged Condition: Stable    Follow Up: No follow-up provider specified. Lab Frequency Next Occurrence   Up as tolerated PRN    Intake and output EVERY 8 HOURS    Nasal Cannula oxygen DAILY (RT)    Pulse oximetry, continuous CONTINUOUS WITH Q4H RT CHECKS    Basic Metabolic Panel w/ Reflex to MG Daily (Lab)    Brain Natriuretic Peptide Daily (Lab)    POCT glucose 4 TIMES DAILY BEFORE MEALS & AT BEDTIME    HYPOGLYCEMIA TREATMENT: blood glucose LESS THAN 70 mg/dL and patient ALERT and TOLERATING PO PRN    HYPOGLYCEMIA TREATMENT: blood glucose LESS THAN 70 mg/dL and patient NOT ALERT or NPO PRN    POCT Glucose PRN    Ammonia Daily (Lab)          Diet: ADULT DIET;  Regular    Discharge Medications:      Medication List        START taking these medications      lactulose 10 GM/15ML solution  Commonly known as: CHRONULAC  Take 30 mLs by mouth daily as needed (for ammonia level greater than 40) CHANGE how you take these medications      * levETIRAcetam 500 MG tablet  Commonly known as: KEPPRA  Take 1 tablet by mouth 2 times daily for 12 doses  What changed:   medication strength  when to take this     * levETIRAcetam 500 MG tablet  Commonly known as: KEPPRA  Take 1 tablet by mouth daily for 6 doses  Start taking on: September 28, 2022  What changed:   medication strength  how much to take  when to take this  These instructions start on September 28, 2022. If you are unsure what to do until then, ask your doctor or other care provider. * This list has 2 medication(s) that are the same as other medications prescribed for you. Read the directions carefully, and ask your doctor or other care provider to review them with you.                 CONTINUE taking these medications      aluminum & magnesium hydroxide-simethicone 200-200-20 MG/5ML Susp suspension  Commonly known as: MAALOX     APPLE CIDER VINEGAR PO     carvedilol 25 MG tablet  Commonly known as: COREG     CoQ10 100 MG Caps     divalproex 500 MG DR tablet  Commonly known as: DEPAKOTE     docusate sodium 100 MG capsule  Commonly known as: COLACE     DULoxetine 30 MG extended release capsule  Commonly known as: CYMBALTA     GINKGO BILOBA EXTRACT PO     glimepiride 1 MG tablet  Commonly known as: AMARYL     lisinopril 10 MG tablet  Commonly known as: PRINIVIL;ZESTRIL     Magnesium 500 MG Tabs     megestrol 40 MG tablet  Commonly known as: MEGACE     Melatonin 10 MG Tabs     metFORMIN 500 MG extended release tablet  Commonly known as: GLUCOPHAGE-XR     ondansetron 4 MG disintegrating tablet  Commonly known as: ZOFRAN-ODT     pantoprazole 40 MG tablet  Commonly known as: PROTONIX     potassium chloride 20 MEQ extended release tablet  Commonly known as: KLOR-CON M     PROBIOTIC ADVANCED PO     simvastatin 20 MG tablet  Commonly known as: ZOCOR     tamoxifen 20 MG tablet  Commonly known as: NOLVADEX     therapeutic multivitamin-minerals

## 2022-09-22 NOTE — PROGRESS NOTES
GI Progress notes    9/22/2022   11:16 AM    Name:  Michael Olivera  MRN:    [id number]     Acct:     [de-identified]   Room:  2025/2025-01  IP Day: 1     Admit Date: 9/19/2022 11:47 AM  PCP: Dorcas Porter MD    Subjective:     C/C:   Chief Complaint   Patient presents with    Altered Mental Status       Interval History: Status: improved. Patient seen and examined  No acute events overnight  Patient is resting quietly.  at bedside. Reports she is more at baseline. Ammonia improved  Depakote level - no toxicity    reports patient is eating very well    ROS:  Constitutional: negative for chills, fevers and sweats  Gastrointestinal: negative for abdominal pain, constipation, diarrhea, nausea and vomiting  Neurological: negative for dizziness and headaches    Medications: Allergies:    Allergies   Allergen Reactions    Ciprofloxacin Other (See Comments)     Other reaction(s): anxiety, leg cramps  Other reaction(s): anxiety, leg cramps      Pcn [Penicillins] Rash    Sulfa Antibiotics Rash       Current Meds: pantoprazole (PROTONIX) tablet 40 mg, Nightly  lactulose (CHRONULAC) 10 GM/15ML solution 20 g, Daily PRN  [START ON 10/9/2022] megestrol (MEGACE) tablet 80 mg, BID  tamoxifen (NOLVADEX) tablet 20 mg, BID  azithromycin (ZITHROMAX) 500 mg in D5W 250ml addavial, Q24H  cefTRIAXone (ROCEPHIN) 1,000 mg in dextrose 5 % 50 mL IVPB mini-bag, Q24H  carvedilol (COREG) tablet 25 mg, BID  DULoxetine (CYMBALTA) extended release capsule 30 mg, Daily  lisinopril (PRINIVIL;ZESTRIL) tablet 10 mg, Daily  potassium chloride (KLOR-CON M) extended release tablet 20 mEq, Daily  metFORMIN (GLUCOPHAGE-XR) extended release tablet 1,000 mg, BID  traZODone (DESYREL) tablet 100 mg, Nightly  magnesium oxide (MAG-OX) tablet 400 mg, BID  therapeutic multivitamin-minerals 1 tablet, Daily  CoQ10 CAPS 100 mg, Daily  docusate sodium (COLACE) capsule 100 mg, BID PRN  vitamin B-12 (CYANOCOBALAMIN) tablet 1,000 mcg, Daily  melatonin tablet 9 mg, Nightly PRN  divalproex (DEPAKOTE ER) extended release tablet 500 mg, BID  glucose chewable tablet 16 g, PRN  dextrose bolus 10% 125 mL, PRN   Or  dextrose bolus 10% 250 mL, PRN  glucagon (rDNA) injection 1 mg, PRN  dextrose 10 % infusion, Continuous PRN  levETIRAcetam (KEPPRA) tablet 500 mg, BID   Followed by  Sara Nance ON 9/28/2022] levETIRAcetam (KEPPRA) tablet 500 mg, Daily  sodium chloride flush 0.9 % injection 10 mL, 2 times per day  sodium chloride flush 0.9 % injection 10 mL, PRN  0.9 % sodium chloride infusion, PRN  potassium chloride (KLOR-CON M) extended release tablet 40 mEq, PRN   Or  potassium bicarb-citric acid (EFFER-K) effervescent tablet 40 mEq, PRN   Or  potassium chloride 10 mEq/100 mL IVPB (Peripheral Line), PRN  magnesium sulfate 1000 mg in dextrose 5% 100 mL IVPB, PRN  enoxaparin (LOVENOX) injection 40 mg, Daily  ondansetron (ZOFRAN-ODT) disintegrating tablet 4 mg, Q8H PRN   Or  ondansetron (ZOFRAN) injection 4 mg, Q6H PRN  senna (SENOKOT) tablet 8.6 mg, BID PRN  acetaminophen (TYLENOL) tablet 650 mg, Q6H PRN   Or  acetaminophen (TYLENOL) suppository 650 mg, Q6H PRN        Data:     Code Status:  Full Code    No family history on file.     Social History     Socioeconomic History    Marital status:      Spouse name: Not on file    Number of children: Not on file    Years of education: Not on file    Highest education level: Not on file   Occupational History    Not on file   Tobacco Use    Smoking status: Never    Smokeless tobacco: Never   Vaping Use    Vaping Use: Never used   Substance and Sexual Activity    Alcohol use: Never    Drug use: Never    Sexual activity: Not on file   Other Topics Concern    Not on file   Social History Narrative    Not on file     Social Determinants of Health     Financial Resource Strain: Not on file   Food Insecurity: Not on file   Transportation Needs: Not on file   Physical Activity: Not on file   Stress: Not on file Social Connections: Not on file   Intimate Partner Violence: Not on file   Housing Stability: Not on file       Vitals:  /68   Pulse (!) 103   Temp 98.1 °F (36.7 °C) (Oral)   Resp 16   Ht 5' 2\" (1.575 m)   Wt 198 lb 1.6 oz (89.9 kg)   SpO2 92%   BMI 36.23 kg/m²   Temp (24hrs), Av.9 °F (36.6 °C), Min:97.5 °F (36.4 °C), Max:98.1 °F (36.7 °C)    Recent Labs     22  1556 22  2047 22  0630 22  0715   POCGLU 162* 139* 43* 111*       I/O (24Hr):   No intake or output data in the 24 hours ending 22 1116    Labs:      CBC:   Lab Results   Component Value Date/Time    WBC 6.3 2022 06:53 AM    RBC 3.11 2022 06:53 AM    HGB 9.8 2022 06:53 AM    HCT 30.9 2022 06:53 AM    MCV 99.4 2022 06:53 AM    MCH 31.5 2022 06:53 AM    MCHC 31.7 2022 06:53 AM    RDW 16.5 2022 06:53 AM     2022 06:53 AM    MPV 9.8 2022 06:53 AM     CBC with Differential:    Lab Results   Component Value Date/Time    WBC 6.3 2022 06:53 AM    RBC 3.11 2022 06:53 AM    HGB 9.8 2022 06:53 AM    HCT 30.9 2022 06:53 AM     2022 06:53 AM    MCV 99.4 2022 06:53 AM    MCH 31.5 2022 06:53 AM    MCHC 31.7 2022 06:53 AM    RDW 16.5 2022 06:53 AM    LYMPHOPCT 27 2022 06:53 AM    MONOPCT 8 2022 06:53 AM    BASOPCT 0 2022 06:53 AM    MONOSABS 0.51 2022 06:53 AM    LYMPHSABS 1.72 2022 06:53 AM    EOSABS 0.22 2022 06:53 AM    BASOSABS <0.03 2022 06:53 AM     Hemoglobin/Hematocrit:    Lab Results   Component Value Date/Time    HGB 9.8 2022 06:53 AM    HCT 30.9 2022 06:53 AM     CMP:    Lab Results   Component Value Date/Time     2022 06:53 AM    K 4.0 2022 06:53 AM     2022 06:53 AM    CO2 22 2022 06:53 AM    BUN 5 2022 06:53 AM    CREATININE 0.40 2022 06:53 AM    GFRAA >60 2022 06:53 AM    LABGLOM >60 09/22/2022 06:53 AM    GLUCOSE 55 09/22/2022 06:53 AM    PROT 4.8 09/22/2022 06:53 AM    LABALBU 3.0 09/22/2022 06:53 AM    CALCIUM 8.4 09/22/2022 06:53 AM    BILITOT 0.1 09/22/2022 06:53 AM    ALKPHOS 39 09/22/2022 06:53 AM    AST 13 09/22/2022 06:53 AM    ALT 12 09/22/2022 06:53 AM     BMP:    Lab Results   Component Value Date/Time     09/22/2022 06:53 AM    K 4.0 09/22/2022 06:53 AM     09/22/2022 06:53 AM    CO2 22 09/22/2022 06:53 AM    BUN 5 09/22/2022 06:53 AM    LABALBU 3.0 09/22/2022 06:53 AM    CREATININE 0.40 09/22/2022 06:53 AM    CALCIUM 8.4 09/22/2022 06:53 AM    GFRAA >60 09/22/2022 06:53 AM    LABGLOM >60 09/22/2022 06:53 AM    GLUCOSE 55 09/22/2022 06:53 AM     PT/INR:  No results found for: PROTIME, INR  PTT:  No results found for: APTT, PTT[APTT}    Physical Examination:        General appearance: alert, cooperative and no distress  Mental Status: oriented to person, place and time and normal affect  Abdomen: soft, nontender, nondistended, bowel sounds present  Extremities: no edema, redness or tenderness in the calves  Skin: no gross lesions, rashes, or induration    Assessment:        Primary Problem  AMS (altered mental status)     Active Hospital Problems    Diagnosis Date Noted    Increased ammonia level [R79.89] 09/21/2022     Priority: Medium    Weakness [R53.1] 09/21/2022     Priority: Medium    AMS (altered mental status) [R41.82] 09/19/2022     Priority: Medium     Past Medical History:   Diagnosis Date    Anxiety     Arteriovenous malformation of liver     Arthritis     [facility]     Depression     GERD (gastroesophageal reflux disease)     HTN (hypertension)     Hyperlipidemia     Hyperlipidemia     Insomnia     Malignant neoplasm metastatic to bone ([facility]     Malignant neoplasm of endocervix (HCC)     OP (osteoporosis)     Pneumonia     Seizure (HCC)     Type 2 diabetes mellitus ([facility]     UTI (urinary tract infection)         Plan:        Elevated ammonia with normal LFTs, US liver hepatic steatosis  Depakote levels WNL  Ammonia improved 39  LFTs normal  Acute hepatitis panel negative  Clinically improved  Tx'd for pneumonia  Supportive care  GI signing off, consult as needed    Explained to the patient and d/W Nursing Staff  Will F/U with you  Please call or Page for any issues or change in status  Thanks    Electronically signed by TOMY Hsu NP on 9/22/2022 at 11:16 AM

## 2022-09-22 NOTE — PLAN OF CARE
Problem: Discharge Planning  Goal: Discharge to home or other facility with appropriate resources  9/22/2022 0439 by Tony Reyna RN  Outcome: Progressing  Flowsheets (Taken 9/22/2022 8250)  Discharge to home or other facility with appropriate resources:   Identify barriers to discharge with patient and caregiver   Refer to discharge planning if patient needs post-hospital services based on physician order or complex needs related to functional status, cognitive ability or social support system  9/21/2022 1447 by Michi Ortez RN  Outcome: Progressing  Flowsheets (Taken 9/21/2022 9977)  Discharge to home or other facility with appropriate resources:   Identify barriers to discharge with patient and caregiver   Arrange for needed discharge resources and transportation as appropriate   Identify discharge learning needs (meds, wound care, etc)   Refer to discharge planning if patient needs post-hospital services based on physician order or complex needs related to functional status, cognitive ability or social support system     Problem: Pain  Goal: Verbalizes/displays adequate comfort level or baseline comfort level  9/22/2022 0439 by Tony Reyna RN  Outcome: Progressing  Flowsheets (Taken 9/22/2022 9898)  Verbalizes/displays adequate comfort level or baseline comfort level:   Encourage patient to monitor pain and request assistance   Assess pain using appropriate pain scale   Consider cultural and social influences on pain and pain management  9/21/2022 1447 by Michi Ortez RN  Outcome: Progressing  Flowsheets (Taken 9/21/2022 0955)  Verbalizes/displays adequate comfort level or baseline comfort level:   Assess pain using appropriate pain scale   Administer analgesics based on type and severity of pain and evaluate response   Implement non-pharmacological measures as appropriate and evaluate response   Notify Licensed Independent Practitioner if interventions unsuccessful or patient reports new pain     Problem: Safety - Adult  Goal: Free from fall injury  9/22/2022 0439 by Gricel Hays RN  Outcome: Progressing  Flowsheets (Taken 9/22/2022 0439)  Free From Fall Injury: Instruct family/caregiver on patient safety  9/21/2022 1447 by Tereza Amaro RN  Outcome: Progressing  Flowsheets (Taken 9/21/2022 1438)  Free From Fall Injury: Erroxanne Spikes family/caregiver on patient safety     Problem: Neurosensory - Adult  Goal: Absence of seizures  9/22/2022 0439 by Gricel Hays RN  Outcome: Progressing  Flowsheets (Taken 9/22/2022 0439)  Absence of seizures:   Monitor for seizure activity. If seizure occurs, document type and location of movements and any associated apnea   Administer anticonvulsants as ordered   Diagnostic studies as ordered  9/21/2022 1447 by Tereza Amaro RN  Outcome: Progressing  Flowsheets (Taken 9/21/2022 0955)  Absence of seizures:   Monitor for seizure activity.   If seizure occurs, document type and location of movements and any associated apnea   If seizure occurs, turn head to side and suction secretions as needed   Administer anticonvulsants as ordered   Support airway/breathing, administer oxygen as needed   Diagnostic studies as ordered     Problem: Neurosensory - Adult  Goal: Achieves maximal functionality and self care  9/22/2022 0439 by Gricel Hays RN  Outcome: Progressing  Flowsheets (Taken 9/22/2022 0439)  Achieves maximal functionality and self care:   Monitor swallowing and airway patency with patient fatigue and changes in neurological status   Encourage and assist patient to increase activity and self care with guidance from physical therapy/occupational therapy  9/21/2022 1447 by Tereza Amaro RN  Outcome: Progressing  Flowsheets (Taken 9/21/2022 0955)  Achieves maximal functionality and self care:   Monitor swallowing and airway patency with patient fatigue and changes in neurological status   Encourage and assist patient to increase activity and self care with guidance from physical therapy/occupational therapy   Encourage visually impaired, hearing impaired and aphasic patients to use assistive/communication devices     Problem: Skin/Tissue Integrity - Adult  Goal: Skin integrity remains intact  9/22/2022 0439 by Thee Ortiz RN  Outcome: Progressing  Flowsheets (Taken 9/22/2022 7993)  Skin Integrity Remains Intact: Monitor for areas of redness and/or skin breakdown  9/21/2022 1447 by Martine Chamorro RN  Outcome: Progressing  Flowsheets (Taken 9/21/2022 0955)  Skin Integrity Remains Intact: Monitor for areas of redness and/or skin breakdown     Problem: Infection - Adult  Goal: Absence of infection during hospitalization  9/22/2022 0439 by Thee Ortiz RN  Outcome: Progressing  Flowsheets (Taken 9/22/2022 2673)  Absence of infection during hospitalization:   Assess and monitor for signs and symptoms of infection   Monitor lab/diagnostic results   Administer medications as ordered   Instruct and encourage patient and family to use good hand hygiene technique  9/21/2022 1447 by Martine Chamorro RN  Outcome: Progressing  Flowsheets (Taken 9/21/2022 0955)  Absence of infection during hospitalization:   Assess and monitor for signs and symptoms of infection   Monitor lab/diagnostic results   Administer medications as ordered   Instruct and encourage patient and family to use good hand hygiene technique   Identify and instruct in appropriate isolation precautions for identified infection/condition     Problem: Confusion  Goal: Confusion, delirium, dementia, or psychosis is improved or at baseline  Description: INTERVENTIONS:  1. Assess for possible contributors to thought disturbance, including medications, impaired vision or hearing, underlying metabolic abnormalities, dehydration, psychiatric diagnoses, and notify attending LIP  2. [place] high risk fall precautions, as indicated  3.  Provide frequent short contacts to provide reality reorientation, refocusing and direction  4. Decrease environmental stimuli, including noise as appropriate  5. Monitor and intervene to maintain adequate nutrition, hydration, elimination, sleep and activity  6. If unable to ensure safety without constant attention obtain sitter and review sitter guidelines with assigned personnel  7.  Initiate Psychosocial CNS and Spiritual Care consult, as indicated  9/22/2022 0439 by Alicia Cortes RN  Outcome: Progressing  Flowsheets (Taken 9/22/2022 4404)  Effect of thought disturbance (confusion, delirium, dementia, or psychosis) are managed with adequate functional status:   Assess for contributors to thought disturbance, including medications, impaired vision or hearing, underlying metabolic abnormalities, dehydration, psychiatric diagnoses, notify [place] high risk fall precautions, as indicated   Provide frequent short contacts to provide reality reorientation, refocusing and direction   Decrease environmental stimuli, including noise as appropriate   Monitor and intervene to maintain adequate nutrition, hydration, elimination, sleep and activity  9/21/2022 1447 by Los Syed RN  Outcome: Progressing  Flowsheets (Taken 9/21/2022 0938)  Effect of thought disturbance (confusion, delirium, dementia, or psychosis) are managed with adequate functional status:   Assess for contributors to thought disturbance, including medications, impaired vision or hearing, underlying metabolic abnormalities, dehydration, psychiatric diagnoses, notify [place] high risk fall precautions, as indicated   Provide frequent short contacts to provide reality reorientation, refocusing and direction   Decrease environmental stimuli, including noise as appropriate   Monitor and intervene to maintain adequate nutrition, hydration, elimination, sleep and activity   If unable to ensure safety without constant attention obtain sitter and review sitter guidelines with assigned personnel     Problem: Chronic Conditions and Co-morbidities  Goal: Patient's chronic conditions and co-morbidity symptoms are monitored and maintained or improved  9/22/2022 0439 by Dara Baltazar RN  Outcome: Progressing  Flowsheets (Taken 9/22/2022 5844)  Care Plan - Patient's Chronic Conditions and Co-Morbidity Symptoms are Monitored and Maintained or Improved:   Monitor and assess patient's chronic conditions and comorbid symptoms for stability, deterioration, or improvement   Collaborate with multidisciplinary team to address chronic and comorbid conditions and prevent exacerbation or deterioration   Update acute care plan with appropriate goals if chronic or comorbid symptoms are exacerbated and prevent overall improvement and discharge  9/21/2022 1447 by Ugo Ramos RN  Outcome: Progressing  Flowsheets (Taken 9/21/2022 0955)  Care Plan - Patient's Chronic Conditions and Co-Morbidity Symptoms are Monitored and Maintained or Improved:   Monitor and assess patient's chronic conditions and comorbid symptoms for stability, deterioration, or improvement   Collaborate with multidisciplinary team to address chronic and comorbid conditions and prevent exacerbation or deterioration   Update acute care plan with appropriate goals if chronic or comorbid symptoms are exacerbated and prevent overall improvement and discharge     Problem: ABCDS Injury Assessment  Goal: Absence of physical injury  9/22/2022 0439 by Dara Baltazar RN  Outcome: Progressing  Flowsheets (Taken 9/22/2022 0439)  Absence of Physical Injury: Implement safety measures based on patient assessment  9/21/2022 1447 by Ugo Ramos RN  Outcome: Progressing  Flowsheets (Taken 9/21/2022 1447)  Absence of Physical Injury: Implement safety measures based on patient assessment

## 2022-09-23 NOTE — PROGRESS NOTES
Patient left via [facility] at 822pm  Patient's  will meet them at [facility]. Report called and given to RN at Facility.

## 2022-09-23 NOTE — PLAN OF CARE
Pain level assessment complete. Patient educated on pain scale and control interventions  PRN pain medication given per patient request  Patient instructed to call out with new onset of pain or unrelieved pain  Siderails up x 2  Hourly rounding  Call light in reach  Instructed to call for assist before attempting out of bed. Remains free from falls and accidental injury at this time   Floor free from obstacles  Bed is locked and in lowest position  Adequate lighting provided  Bed alarm on, [place] star and Stay with Me signs posted    Checked for incontinence every 2 hours and prn. Pericare as needed. Assisted to reposition off back frequently. On waffle mattress. Heels off bed with pillows.

## 2022-09-24 LAB
CULTURE: NORMAL
CULTURE: NORMAL
Lab: NORMAL
Lab: NORMAL
SPECIMEN DESCRIPTION: NORMAL
SPECIMEN DESCRIPTION: NORMAL